# Patient Record
Sex: FEMALE | Race: BLACK OR AFRICAN AMERICAN | NOT HISPANIC OR LATINO | URBAN - METROPOLITAN AREA
[De-identification: names, ages, dates, MRNs, and addresses within clinical notes are randomized per-mention and may not be internally consistent; named-entity substitution may affect disease eponyms.]

---

## 2024-05-27 ENCOUNTER — INPATIENT (INPATIENT)
Facility: HOSPITAL | Age: 74
LOS: 5 days | Discharge: HOME | DRG: 812 | End: 2024-06-02
Attending: INTERNAL MEDICINE | Admitting: STUDENT IN AN ORGANIZED HEALTH CARE EDUCATION/TRAINING PROGRAM
Payer: MEDICARE

## 2024-05-27 VITALS
HEART RATE: 101 BPM | SYSTOLIC BLOOD PRESSURE: 119 MMHG | HEIGHT: 66 IN | TEMPERATURE: 99 F | DIASTOLIC BLOOD PRESSURE: 79 MMHG | RESPIRATION RATE: 18 BRPM | WEIGHT: 167.99 LBS | OXYGEN SATURATION: 98 %

## 2024-05-27 DIAGNOSIS — E78.5 HYPERLIPIDEMIA, UNSPECIFIED: ICD-10-CM

## 2024-05-27 DIAGNOSIS — F20.9 SCHIZOPHRENIA, UNSPECIFIED: ICD-10-CM

## 2024-05-27 DIAGNOSIS — E11.22 TYPE 2 DIABETES MELLITUS WITH DIABETIC CHRONIC KIDNEY DISEASE: ICD-10-CM

## 2024-05-27 DIAGNOSIS — D62 ACUTE POSTHEMORRHAGIC ANEMIA: ICD-10-CM

## 2024-05-27 DIAGNOSIS — N17.9 ACUTE KIDNEY FAILURE, UNSPECIFIED: ICD-10-CM

## 2024-05-27 DIAGNOSIS — I12.9 HYPERTENSIVE CHRONIC KIDNEY DISEASE WITH STAGE 1 THROUGH STAGE 4 CHRONIC KIDNEY DISEASE, OR UNSPECIFIED CHRONIC KIDNEY DISEASE: ICD-10-CM

## 2024-05-27 DIAGNOSIS — D32.9 BENIGN NEOPLASM OF MENINGES, UNSPECIFIED: ICD-10-CM

## 2024-05-27 DIAGNOSIS — N18.4 CHRONIC KIDNEY DISEASE, STAGE 4 (SEVERE): ICD-10-CM
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
## 2024-05-27 DIAGNOSIS — I25.2 OLD MYOCARDIAL INFARCTION: ICD-10-CM

## 2024-05-27 DIAGNOSIS — R18.8 OTHER ASCITES: ICD-10-CM

## 2024-05-27 DIAGNOSIS — S30.1XXA CONTUSION OF ABDOMINAL WALL, INITIAL ENCOUNTER: ICD-10-CM

## 2024-05-27 DIAGNOSIS — E87.5 HYPERKALEMIA: ICD-10-CM

## 2024-05-27 DIAGNOSIS — T80.89XA OTHER COMPLICATIONS FOLLOWING INFUSION, TRANSFUSION AND THERAPEUTIC INJECTION, INITIAL ENCOUNTER: ICD-10-CM

## 2024-05-27 DIAGNOSIS — Z86.718 PERSONAL HISTORY OF OTHER VENOUS THROMBOSIS AND EMBOLISM: ICD-10-CM
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
## 2024-05-27 DIAGNOSIS — K59.00 CONSTIPATION, UNSPECIFIED: ICD-10-CM

## 2024-05-27 LAB
ALBUMIN SERPL ELPH-MCNC: 4.1 G/DL — SIGNIFICANT CHANGE UP (ref 3.5–5.2)
ALP SERPL-CCNC: 68 U/L — SIGNIFICANT CHANGE UP (ref 30–115)
ALT FLD-CCNC: 6 U/L — SIGNIFICANT CHANGE UP (ref 0–41)
ANION GAP SERPL CALC-SCNC: 10 MMOL/L — SIGNIFICANT CHANGE UP (ref 7–14)
APPEARANCE UR: CLEAR — SIGNIFICANT CHANGE UP
APTT BLD: 44 SEC — HIGH (ref 27–39.2)
AST SERPL-CCNC: 14 U/L — SIGNIFICANT CHANGE UP (ref 0–41)
BASE EXCESS BLDV CALC-SCNC: -1.3 MMOL/L — SIGNIFICANT CHANGE UP (ref -2–3)
BASOPHILS # BLD AUTO: 0.01 K/UL — SIGNIFICANT CHANGE UP (ref 0–0.2)
BASOPHILS NFR BLD AUTO: 0.1 % — SIGNIFICANT CHANGE UP (ref 0–1)
BILIRUB SERPL-MCNC: <0.2 MG/DL — SIGNIFICANT CHANGE UP (ref 0.2–1.2)
BILIRUB UR-MCNC: NEGATIVE — SIGNIFICANT CHANGE UP
BUN SERPL-MCNC: 48 MG/DL — HIGH (ref 10–20)
CA-I SERPL-SCNC: 1.18 MMOL/L — SIGNIFICANT CHANGE UP (ref 1.15–1.33)
CALCIUM SERPL-MCNC: 9.3 MG/DL — SIGNIFICANT CHANGE UP (ref 8.4–10.4)
CHLORIDE SERPL-SCNC: 103 MMOL/L — SIGNIFICANT CHANGE UP (ref 98–110)
CO2 SERPL-SCNC: 23 MMOL/L — SIGNIFICANT CHANGE UP (ref 17–32)
COLOR SPEC: YELLOW — SIGNIFICANT CHANGE UP
CREAT SERPL-MCNC: 1.7 MG/DL — HIGH (ref 0.7–1.5)
DIFF PNL FLD: NEGATIVE — SIGNIFICANT CHANGE UP
EGFR: 31 ML/MIN/1.73M2 — LOW
EOSINOPHIL # BLD AUTO: 0 K/UL — SIGNIFICANT CHANGE UP (ref 0–0.7)
EOSINOPHIL NFR BLD AUTO: 0 % — SIGNIFICANT CHANGE UP (ref 0–8)
GAS PNL BLDV: 134 MMOL/L — LOW (ref 136–145)
GAS PNL BLDV: SIGNIFICANT CHANGE UP
GLUCOSE SERPL-MCNC: 117 MG/DL — HIGH (ref 70–99)
GLUCOSE UR QL: NEGATIVE MG/DL — SIGNIFICANT CHANGE UP
HCO3 BLDV-SCNC: 25 MMOL/L — SIGNIFICANT CHANGE UP (ref 22–29)
HCT VFR BLD CALC: 27.6 % — LOW (ref 37–47)
HCT VFR BLDA CALC: 29 % — LOW (ref 34.5–46.5)
HGB BLD CALC-MCNC: 9.8 G/DL — LOW (ref 11.7–16.1)
HGB BLD-MCNC: 8.6 G/DL — LOW (ref 12–16)
IMM GRANULOCYTES NFR BLD AUTO: 0.6 % — HIGH (ref 0.1–0.3)
INR BLD: 1.23 RATIO — SIGNIFICANT CHANGE UP (ref 0.65–1.3)
KETONES UR-MCNC: NEGATIVE MG/DL — SIGNIFICANT CHANGE UP
LACTATE BLDV-MCNC: 1.1 MMOL/L — SIGNIFICANT CHANGE UP (ref 0.5–2)
LEUKOCYTE ESTERASE UR-ACNC: NEGATIVE — SIGNIFICANT CHANGE UP
LIDOCAIN IGE QN: 18 U/L — SIGNIFICANT CHANGE UP (ref 7–60)
LYMPHOCYTES # BLD AUTO: 1.58 K/UL — SIGNIFICANT CHANGE UP (ref 1.2–3.4)
LYMPHOCYTES # BLD AUTO: 19.4 % — LOW (ref 20.5–51.1)
MCHC RBC-ENTMCNC: 27.6 PG — SIGNIFICANT CHANGE UP (ref 27–31)
MCHC RBC-ENTMCNC: 31.2 G/DL — LOW (ref 32–37)
MCV RBC AUTO: 88.5 FL — SIGNIFICANT CHANGE UP (ref 81–99)
MONOCYTES # BLD AUTO: 0.61 K/UL — HIGH (ref 0.1–0.6)
MONOCYTES NFR BLD AUTO: 7.5 % — SIGNIFICANT CHANGE UP (ref 1.7–9.3)
NEUTROPHILS # BLD AUTO: 5.91 K/UL — SIGNIFICANT CHANGE UP (ref 1.4–6.5)
NEUTROPHILS NFR BLD AUTO: 72.4 % — SIGNIFICANT CHANGE UP (ref 42.2–75.2)
NITRITE UR-MCNC: NEGATIVE — SIGNIFICANT CHANGE UP
NRBC # BLD: 0 /100 WBCS — SIGNIFICANT CHANGE UP (ref 0–0)
PCO2 BLDV: 47 MMHG — HIGH (ref 39–42)
PH BLDV: 7.33 — SIGNIFICANT CHANGE UP (ref 7.32–7.43)
PH UR: 6 — SIGNIFICANT CHANGE UP (ref 5–8)
PLATELET # BLD AUTO: 332 K/UL — SIGNIFICANT CHANGE UP (ref 130–400)
PMV BLD: 10.5 FL — HIGH (ref 7.4–10.4)
PO2 BLDV: 14 MMHG — LOW (ref 25–45)
POTASSIUM BLDV-SCNC: 6 MMOL/L — HIGH (ref 3.5–5.1)
POTASSIUM SERPL-MCNC: 6.2 MMOL/L — CRITICAL HIGH (ref 3.5–5)
POTASSIUM SERPL-SCNC: 6.2 MMOL/L — CRITICAL HIGH (ref 3.5–5)
PROT SERPL-MCNC: 7.5 G/DL — SIGNIFICANT CHANGE UP (ref 6–8)
PROT UR-MCNC: NEGATIVE MG/DL — SIGNIFICANT CHANGE UP
PROTHROM AB SERPL-ACNC: 14.1 SEC — HIGH (ref 9.95–12.87)
RBC # BLD: 3.12 M/UL — LOW (ref 4.2–5.4)
RBC # FLD: 13.6 % — SIGNIFICANT CHANGE UP (ref 11.5–14.5)
SAO2 % BLDV: 17.9 % — LOW (ref 67–88)
SODIUM SERPL-SCNC: 136 MMOL/L — SIGNIFICANT CHANGE UP (ref 135–146)
SP GR SPEC: >1.03 — HIGH (ref 1–1.03)
UROBILINOGEN FLD QL: 0.2 MG/DL — SIGNIFICANT CHANGE UP (ref 0.2–1)
WBC # BLD: 8.16 K/UL — SIGNIFICANT CHANGE UP (ref 4.8–10.8)
WBC # FLD AUTO: 8.16 K/UL — SIGNIFICANT CHANGE UP (ref 4.8–10.8)

## 2024-05-27 PROCEDURE — 93970 EXTREMITY STUDY: CPT

## 2024-05-27 PROCEDURE — 84300 ASSAY OF URINE SODIUM: CPT

## 2024-05-27 PROCEDURE — 36415 COLL VENOUS BLD VENIPUNCTURE: CPT

## 2024-05-27 PROCEDURE — 80048 BASIC METABOLIC PNL TOTAL CA: CPT

## 2024-05-27 PROCEDURE — 74174 CTA ABD&PLVS W/CONTRAST: CPT | Mod: MC

## 2024-05-27 PROCEDURE — 83605 ASSAY OF LACTIC ACID: CPT

## 2024-05-27 PROCEDURE — 83880 ASSAY OF NATRIURETIC PEPTIDE: CPT

## 2024-05-27 PROCEDURE — 85025 COMPLETE CBC W/AUTO DIFF WBC: CPT

## 2024-05-27 PROCEDURE — 81003 URINALYSIS AUTO W/O SCOPE: CPT

## 2024-05-27 PROCEDURE — 36430 TRANSFUSION BLD/BLD COMPNT: CPT

## 2024-05-27 PROCEDURE — 86900 BLOOD TYPING SEROLOGIC ABO: CPT

## 2024-05-27 PROCEDURE — 76770 US EXAM ABDO BACK WALL COMP: CPT

## 2024-05-27 PROCEDURE — 86923 COMPATIBILITY TEST ELECTRIC: CPT

## 2024-05-27 PROCEDURE — 93010 ELECTROCARDIOGRAM REPORT: CPT

## 2024-05-27 PROCEDURE — P9016: CPT

## 2024-05-27 PROCEDURE — 80053 COMPREHEN METABOLIC PANEL: CPT

## 2024-05-27 PROCEDURE — 74018 RADEX ABDOMEN 1 VIEW: CPT

## 2024-05-27 PROCEDURE — 71045 X-RAY EXAM CHEST 1 VIEW: CPT

## 2024-05-27 PROCEDURE — 74177 CT ABD & PELVIS W/CONTRAST: CPT | Mod: 26,MC

## 2024-05-27 PROCEDURE — 82570 ASSAY OF URINE CREATININE: CPT

## 2024-05-27 PROCEDURE — 99285 EMERGENCY DEPT VISIT HI MDM: CPT

## 2024-05-27 PROCEDURE — 85027 COMPLETE CBC AUTOMATED: CPT

## 2024-05-27 PROCEDURE — 83735 ASSAY OF MAGNESIUM: CPT

## 2024-05-27 PROCEDURE — P9040: CPT

## 2024-05-27 PROCEDURE — 86901 BLOOD TYPING SEROLOGIC RH(D): CPT

## 2024-05-27 PROCEDURE — 82962 GLUCOSE BLOOD TEST: CPT

## 2024-05-27 PROCEDURE — 99221 1ST HOSP IP/OBS SF/LOW 40: CPT

## 2024-05-27 PROCEDURE — 86850 RBC ANTIBODY SCREEN: CPT

## 2024-05-27 RX ORDER — DEXTROSE 50 % IN WATER 50 %
25 SYRINGE (ML) INTRAVENOUS ONCE
Refills: 0 | Status: COMPLETED | OUTPATIENT
Start: 2024-05-27 | End: 2024-05-27

## 2024-05-27 RX ORDER — PIPERACILLIN AND TAZOBACTAM 4; .5 G/20ML; G/20ML
3.38 INJECTION, POWDER, LYOPHILIZED, FOR SOLUTION INTRAVENOUS ONCE
Refills: 0 | Status: COMPLETED | OUTPATIENT
Start: 2024-05-27 | End: 2024-05-27

## 2024-05-27 RX ORDER — SODIUM CHLORIDE 9 MG/ML
1000 INJECTION INTRAMUSCULAR; INTRAVENOUS; SUBCUTANEOUS ONCE
Refills: 0 | Status: COMPLETED | OUTPATIENT
Start: 2024-05-27 | End: 2024-05-27

## 2024-05-27 RX ORDER — SODIUM ZIRCONIUM CYCLOSILICATE 10 G/10G
10 POWDER, FOR SUSPENSION ORAL ONCE
Refills: 0 | Status: COMPLETED | OUTPATIENT
Start: 2024-05-27 | End: 2024-05-27

## 2024-05-27 RX ORDER — VANCOMYCIN HCL 1 G
1250 VIAL (EA) INTRAVENOUS ONCE
Refills: 0 | Status: COMPLETED | OUTPATIENT
Start: 2024-05-27 | End: 2024-05-27

## 2024-05-27 RX ORDER — INSULIN HUMAN 100 [IU]/ML
5 INJECTION, SOLUTION SUBCUTANEOUS ONCE
Refills: 0 | Status: COMPLETED | OUTPATIENT
Start: 2024-05-27 | End: 2024-05-27

## 2024-05-27 RX ADMIN — Medication 25 MILLILITER(S): at 19:43

## 2024-05-27 RX ADMIN — Medication 166.67 MILLIGRAM(S): at 18:56

## 2024-05-27 RX ADMIN — PIPERACILLIN AND TAZOBACTAM 200 GRAM(S): 4; .5 INJECTION, POWDER, LYOPHILIZED, FOR SOLUTION INTRAVENOUS at 18:56

## 2024-05-27 RX ADMIN — INSULIN HUMAN 5 UNIT(S): 100 INJECTION, SOLUTION SUBCUTANEOUS at 19:39

## 2024-05-27 RX ADMIN — SODIUM CHLORIDE 1000 MILLILITER(S): 9 INJECTION INTRAMUSCULAR; INTRAVENOUS; SUBCUTANEOUS at 18:42

## 2024-05-27 RX ADMIN — SODIUM ZIRCONIUM CYCLOSILICATE 10 GRAM(S): 10 POWDER, FOR SUSPENSION ORAL at 19:39

## 2024-05-27 NOTE — ED PROVIDER NOTE - CARE PLAN
Principal Discharge DX:	Abdominal wall fluid collections  Secondary Diagnosis:	Deep hematoma  Secondary Diagnosis:	Normocytic anemia  Secondary Diagnosis:	History of DVT of lower extremity  Secondary Diagnosis:	Hyperkalemia   1

## 2024-05-27 NOTE — ED ADULT NURSE NOTE - NSFALLHARMRISKINTERV_ED_ALL_ED

## 2024-05-27 NOTE — ED PROVIDER NOTE - ATTENDING CONTRIBUTION TO CARE
74-year-old female to ED with abdominal pain over the past several days.  Patient is a senior living facility which she gets intra-abdominal subcu infection injections.  With the past few days noted swelling redness and tenderness especially over the right lower quadrant.  On exam large firm red tender mass

## 2024-05-27 NOTE — ED PROVIDER NOTE - OBJECTIVE STATEMENT
Patient is a 74-year-old woman with a history of hypertension, diabetes, CKD, schizophrenia, renal cell carcinoma, surgical history of oophorectomy, resident of [facility], brought in by EMS for abdominal pain.  Patient states that the right lower quadrant of her abdomen has been burning since yesterday.  On exam, right lower quadrant is noted to be indurated and with increased warmth to the touch.  No fever.  No nausea, vomiting, diarrhea.  Urinary symptoms, including dysuria, frequency, urgency.  No chest pain, shortness of breath, cough. Patient is a 74-year-old woman with a history of hypertension, diabetes, CKD, schizophrenia, renal cell carcinoma, DVT on Lovenox, surgical history of oophorectomy, resident of [facility], brought in by EMS for abdominal pain.  Patient states that the right lower quadrant of her abdomen has been burning since yesterday.  On exam, right lower quadrant is noted to be indurated and with increased warmth to the touch.  No fever.  No nausea, vomiting, diarrhea.  Urinary symptoms, including dysuria, frequency, urgency.  No chest pain, shortness of breath, cough.

## 2024-05-27 NOTE — CONSULT NOTE ADULT - ATTENDING COMMENTS
ACS Attending Note Attestation    Patient is examined and evaluated at the bedside with the residents/PAs. Treatment plan discussed with the team, nurses, and consulting physicians and consulting teams. Medications, radiological studies and all other relevant studies reviewed. I reviewed the resident/PA note and agreed with above assessment and plan with following additions and corrections.    VANGIE SEBASTIAN Patient is a 74y old  Female who presents with a chief complaint of abdominal wall hematoma referred for surgery evaluation    Physical Exam: right side lower abdominal wall hematoma, not tender on exam  Radiological studies reviewed by me with following noted: CT abdomen/pelvis demonstrated large right side abdominal wall hematoma without extravasation of the contrast   Allergies  haloperidol (Unknown)  Intolerances    PAST MEDICAL & SURGICAL HISTORY:  Diabetes mellitus  Hypertension  Type 2 myocardial infarction due to hypertension  Stage 4 chronic kidney disease  Schizophrenia  MDD (major depressive disorder)  Constipation  History of bilateral oophorectomies      Diagnosis:  Spontaneous abdominal wall hematoma on AC    Plan:	  - follow up Hg/Hct  - No intervention from the surgery stand point  - IR when solid hematoma become liquified   - supportive care  - GI/DVT prophylaxis  - pain management  - follow up consults  - repeat studies as needed    Farida Szymanski MD, FACS  Trauma/ACS/Surcical Critical care Attending

## 2024-05-27 NOTE — ED ADULT TRIAGE NOTE - PATIENT ON (OXYGEN DELIVERY METHOD)
What Type Of Note Output Would You Prefer (Optional)?: Bullet Format How Severe Is Your Skin Lesion?: moderate Has Your Skin Lesion Been Treated?: not been treated Is This A New Presentation, Or A Follow-Up?: Skin Lesion room air

## 2024-05-27 NOTE — ED PROVIDER NOTE - PHYSICAL EXAMINATION
Vital signs reviewed  General: Well nourished, comfortable  Skin: Warm, dry; + large area of induration to the RLQ of the abdomen, with increased warmth to the touch; no blisters, fluctuance, or crepitus  Head & neck: NCAT, supple neck  Eyes: EOMI, PER B/L  ENT: MMM  Card: RRR, S1, S2; no murmurs, no rubs, no gallops  Resp: Normal respiratory effort, CTAB, no rales, no wheezing  Abd: + Large area of induration to the RLQ of the abdomen, with increased warmth to the touch; no blisters, fluctuance, or crepitus, otherwise soft, ND, NT, no rebound, no guarding; no CVAT  Ext: Pulses palpable distally  NeuroMSK: Grossly intact  Psych: Awake, alert, cooperative, appropriate

## 2024-05-27 NOTE — ED ADULT TRIAGE NOTE - NS ED NURSE BANDS TYPE
Detail Level: Generalized Quality 226: Preventive Care And Screening: Tobacco Use: Screening And Cessation Intervention: Patient screened for tobacco use and is an ex/non-smoker Name band;

## 2024-05-27 NOTE — CONSULT NOTE ADULT - ASSESSMENT
74-year-old woman with a history of hypertension, diabetes, CKD, schizophrenia, renal cell carcinoma, DVT on Lovenox, surgical history of oophorectomy, resident of [facility], brought in by EMS for abdominal pain.  Afebrile with normal WBC. CT scan shows anterior abdominal wall collection reflecting hematoma.     Plan:   No acute surgical intervention   Needs abdominal binder  Daily CBC  Avoid injecting lovenox to the abdomen. Would benefit from NOAC if able   IR consult for possible drainage     Above to be discussed with attending, Dr Szymanski.  74-year-old woman with a history of hypertension, diabetes, CKD, schizophrenia, renal cell carcinoma, DVT on Lovenox, surgical history of oophorectomy, resident of [facility], brought in by EMS for abdominal pain.  Afebrile with normal WBC. CT scan shows anterior abdominal wall collection reflecting hematoma.     Plan:   No acute surgical intervention   Needs abdominal binder  Daily CBC  HOLD AC for now   IR consult for possible drainage and/or IVC filter in setting of DVT    Above to be discussed with attending, Dr Szymanski.

## 2024-05-27 NOTE — ED ADULT NURSE NOTE - CHIEF COMPLAINT QUOTE
PT presents to the ED from [facility] c/o of b/l lower quadrant abd pain. PT denies nausea or vomiting

## 2024-05-27 NOTE — ED ADULT TRIAGE NOTE - CHIEF COMPLAINT QUOTE
PT presents to the ED from [facility] c/o of b/l lower quadrant abd pain. PT denies nausea or vomiting

## 2024-05-27 NOTE — ED PROVIDER NOTE - PROGRESS NOTE DETAILS
Resident JERICA Garza: Bedside ultrasound with cobblestoning and fluid collection, suspicious for loculated abscesses and surrounding cellulitis.  Patient going to CT now for further characterization. Resident JERICA Garza: General Surgery consulted. General surgery evaluated the patient, and believes that the fluid collection in the anterior abdominal wall is consistent with a hematoma as opposed to an abscess.  Patient is afebrile, without leukocytosis, and normal lactate.  Nonetheless, blood cultures already sent, and vancomycin ordered.  Hemoglobin noted to be at 8.6, without a prior level.  Patient will need serial CBCs, and holding of anticoagulation.  Hyperkalemia noted, however no EKG changes.  Insulin dextrose and Lokelma ordered.

## 2024-05-27 NOTE — ED PROVIDER NOTE - CLINICAL SUMMARY MEDICAL DECISION MAKING FREE TEXT BOX
Abdominal hematoma versus abscess noted plan for broad-spectrum IV antibiotics consult surgery will need admission and may be consult IR

## 2024-05-28 DIAGNOSIS — Z90.722 ACQUIRED ABSENCE OF OVARIES, BILATERAL: Chronic | ICD-10-CM

## 2024-05-28 LAB
ABO RH CONFIRMATION: SIGNIFICANT CHANGE UP
ALBUMIN SERPL ELPH-MCNC: 3.5 G/DL — SIGNIFICANT CHANGE UP (ref 3.5–5.2)
ALP SERPL-CCNC: 49 U/L — SIGNIFICANT CHANGE UP (ref 30–115)
ALT FLD-CCNC: <5 U/L — SIGNIFICANT CHANGE UP (ref 0–41)
ANION GAP SERPL CALC-SCNC: 10 MMOL/L — SIGNIFICANT CHANGE UP (ref 7–14)
ANION GAP SERPL CALC-SCNC: 11 MMOL/L — SIGNIFICANT CHANGE UP (ref 7–14)
ANION GAP SERPL CALC-SCNC: 9 MMOL/L — SIGNIFICANT CHANGE UP (ref 7–14)
AST SERPL-CCNC: 10 U/L — SIGNIFICANT CHANGE UP (ref 0–41)
BILIRUB SERPL-MCNC: 0.2 MG/DL — SIGNIFICANT CHANGE UP (ref 0.2–1.2)
BLD GP AB SCN SERPL QL: SIGNIFICANT CHANGE UP
BUN SERPL-MCNC: 44 MG/DL — HIGH (ref 10–20)
CALCIUM SERPL-MCNC: 7.9 MG/DL — LOW (ref 8.4–10.4)
CALCIUM SERPL-MCNC: 8.3 MG/DL — LOW (ref 8.4–10.5)
CALCIUM SERPL-MCNC: 8.6 MG/DL — SIGNIFICANT CHANGE UP (ref 8.4–10.5)
CHLORIDE SERPL-SCNC: 103 MMOL/L — SIGNIFICANT CHANGE UP (ref 98–110)
CHLORIDE SERPL-SCNC: 104 MMOL/L — SIGNIFICANT CHANGE UP (ref 98–110)
CHLORIDE SERPL-SCNC: 104 MMOL/L — SIGNIFICANT CHANGE UP (ref 98–110)
CO2 SERPL-SCNC: 20 MMOL/L — SIGNIFICANT CHANGE UP (ref 17–32)
CO2 SERPL-SCNC: 20 MMOL/L — SIGNIFICANT CHANGE UP (ref 17–32)
CO2 SERPL-SCNC: 23 MMOL/L — SIGNIFICANT CHANGE UP (ref 17–32)
CREAT SERPL-MCNC: 1.5 MG/DL — SIGNIFICANT CHANGE UP (ref 0.7–1.5)
CREAT SERPL-MCNC: 1.5 MG/DL — SIGNIFICANT CHANGE UP (ref 0.7–1.5)
CREAT SERPL-MCNC: 1.6 MG/DL — HIGH (ref 0.7–1.5)
EGFR: 34 ML/MIN/1.73M2 — LOW
EGFR: 37 ML/MIN/1.73M2 — LOW
EGFR: 37 ML/MIN/1.73M2 — LOW
GLUCOSE BLDC GLUCOMTR-MCNC: 116 MG/DL — HIGH (ref 70–99)
GLUCOSE BLDC GLUCOMTR-MCNC: 124 MG/DL — HIGH (ref 70–99)
GLUCOSE BLDC GLUCOMTR-MCNC: 125 MG/DL — HIGH (ref 70–99)
GLUCOSE BLDC GLUCOMTR-MCNC: 126 MG/DL — HIGH (ref 70–99)
GLUCOSE BLDC GLUCOMTR-MCNC: 172 MG/DL — HIGH (ref 70–99)
GLUCOSE BLDC GLUCOMTR-MCNC: 211 MG/DL — HIGH (ref 70–99)
GLUCOSE SERPL-MCNC: 108 MG/DL — HIGH (ref 70–99)
GLUCOSE SERPL-MCNC: 126 MG/DL — HIGH (ref 70–99)
GLUCOSE SERPL-MCNC: 295 MG/DL — HIGH (ref 70–99)
HCT VFR BLD CALC: 16.2 % — LOW (ref 37–47)
HCT VFR BLD CALC: 19.6 % — LOW (ref 37–47)
HCT VFR BLD CALC: 20.8 % — LOW (ref 37–47)
HCT VFR BLD CALC: 23.5 % — LOW (ref 37–47)
HGB BLD-MCNC: 5.1 G/DL — CRITICAL LOW (ref 12–16)
HGB BLD-MCNC: 6.4 G/DL — CRITICAL LOW (ref 12–16)
HGB BLD-MCNC: 6.7 G/DL — CRITICAL LOW (ref 12–16)
HGB BLD-MCNC: 7.3 G/DL — LOW (ref 12–16)
MAGNESIUM SERPL-MCNC: 1.8 MG/DL — SIGNIFICANT CHANGE UP (ref 1.8–2.4)
MCHC RBC-ENTMCNC: 27.3 PG — SIGNIFICANT CHANGE UP (ref 27–31)
MCHC RBC-ENTMCNC: 27.4 PG — SIGNIFICANT CHANGE UP (ref 27–31)
MCHC RBC-ENTMCNC: 27.8 PG — SIGNIFICANT CHANGE UP (ref 27–31)
MCHC RBC-ENTMCNC: 28.3 PG — SIGNIFICANT CHANGE UP (ref 27–31)
MCHC RBC-ENTMCNC: 31.1 G/DL — LOW (ref 32–37)
MCHC RBC-ENTMCNC: 31.5 G/DL — LOW (ref 32–37)
MCHC RBC-ENTMCNC: 32.2 G/DL — SIGNIFICANT CHANGE UP (ref 32–37)
MCHC RBC-ENTMCNC: 32.7 G/DL — SIGNIFICANT CHANGE UP (ref 32–37)
MCV RBC AUTO: 86.3 FL — SIGNIFICANT CHANGE UP (ref 81–99)
MCV RBC AUTO: 86.6 FL — SIGNIFICANT CHANGE UP (ref 81–99)
MCV RBC AUTO: 86.7 FL — SIGNIFICANT CHANGE UP (ref 81–99)
MCV RBC AUTO: 88.3 FL — SIGNIFICANT CHANGE UP (ref 81–99)
NRBC # BLD: 0 /100 WBCS — SIGNIFICANT CHANGE UP (ref 0–0)
PLATELET # BLD AUTO: 226 K/UL — SIGNIFICANT CHANGE UP (ref 130–400)
PLATELET # BLD AUTO: 257 K/UL — SIGNIFICANT CHANGE UP (ref 130–400)
PLATELET # BLD AUTO: 260 K/UL — SIGNIFICANT CHANGE UP (ref 130–400)
PLATELET # BLD AUTO: 269 K/UL — SIGNIFICANT CHANGE UP (ref 130–400)
PMV BLD: 10.5 FL — HIGH (ref 7.4–10.4)
PMV BLD: 10.6 FL — HIGH (ref 7.4–10.4)
PMV BLD: 11.6 FL — HIGH (ref 7.4–10.4)
PMV BLD: SIGNIFICANT CHANGE UP (ref 7.4–10.4)
POTASSIUM SERPL-MCNC: 5 MMOL/L — SIGNIFICANT CHANGE UP (ref 3.5–5)
POTASSIUM SERPL-MCNC: 5.6 MMOL/L — HIGH (ref 3.5–5)
POTASSIUM SERPL-MCNC: 5.7 MMOL/L — HIGH (ref 3.5–5)
POTASSIUM SERPL-SCNC: 5 MMOL/L — SIGNIFICANT CHANGE UP (ref 3.5–5)
POTASSIUM SERPL-SCNC: 5.6 MMOL/L — HIGH (ref 3.5–5)
POTASSIUM SERPL-SCNC: 5.7 MMOL/L — HIGH (ref 3.5–5)
PROT SERPL-MCNC: 6.1 G/DL — SIGNIFICANT CHANGE UP (ref 6–8)
RBC # BLD: 1.87 M/UL — LOW (ref 4.2–5.4)
RBC # BLD: 2.26 M/UL — LOW (ref 4.2–5.4)
RBC # BLD: 2.41 M/UL — LOW (ref 4.2–5.4)
RBC # BLD: 2.66 M/UL — LOW (ref 4.2–5.4)
RBC # FLD: 13.4 % — SIGNIFICANT CHANGE UP (ref 11.5–14.5)
RBC # FLD: 13.6 % — SIGNIFICANT CHANGE UP (ref 11.5–14.5)
RBC # FLD: 13.6 % — SIGNIFICANT CHANGE UP (ref 11.5–14.5)
RBC # FLD: 13.7 % — SIGNIFICANT CHANGE UP (ref 11.5–14.5)
SODIUM SERPL-SCNC: 133 MMOL/L — LOW (ref 135–146)
SODIUM SERPL-SCNC: 135 MMOL/L — SIGNIFICANT CHANGE UP (ref 135–146)
SODIUM SERPL-SCNC: 136 MMOL/L — SIGNIFICANT CHANGE UP (ref 135–146)
WBC # BLD: 6.52 K/UL — SIGNIFICANT CHANGE UP (ref 4.8–10.8)
WBC # BLD: 7.74 K/UL — SIGNIFICANT CHANGE UP (ref 4.8–10.8)
WBC # BLD: 8.63 K/UL — SIGNIFICANT CHANGE UP (ref 4.8–10.8)
WBC # BLD: 8.73 K/UL — SIGNIFICANT CHANGE UP (ref 4.8–10.8)
WBC # FLD AUTO: 6.52 K/UL — SIGNIFICANT CHANGE UP (ref 4.8–10.8)
WBC # FLD AUTO: 7.74 K/UL — SIGNIFICANT CHANGE UP (ref 4.8–10.8)
WBC # FLD AUTO: 8.63 K/UL — SIGNIFICANT CHANGE UP (ref 4.8–10.8)
WBC # FLD AUTO: 8.73 K/UL — SIGNIFICANT CHANGE UP (ref 4.8–10.8)

## 2024-05-28 PROCEDURE — 99222 1ST HOSP IP/OBS MODERATE 55: CPT

## 2024-05-28 PROCEDURE — 93970 EXTREMITY STUDY: CPT | Mod: 26

## 2024-05-28 PROCEDURE — 99231 SBSQ HOSP IP/OBS SF/LOW 25: CPT

## 2024-05-28 RX ORDER — HEXAVITAMINS
0.5 TABLET ORAL
Refills: 0 | DISCHARGE

## 2024-05-28 RX ORDER — ACETAMINOPHEN 500 MG
1000 TABLET ORAL EVERY 8 HOURS
Refills: 0 | Status: DISCONTINUED | OUTPATIENT
Start: 2024-05-28 | End: 2024-06-02

## 2024-05-28 RX ORDER — ASCORBIC ACID 60 MG
1 TABLET,CHEWABLE ORAL
Refills: 0 | DISCHARGE

## 2024-05-28 RX ORDER — DEXTROSE 50 % IN WATER 50 %
50 SYRINGE (ML) INTRAVENOUS ONCE
Refills: 0 | Status: COMPLETED | OUTPATIENT
Start: 2024-05-28 | End: 2024-05-28

## 2024-05-28 RX ORDER — FERROUS SULFATE 325(65) MG
1 TABLET ORAL
Refills: 0 | DISCHARGE

## 2024-05-28 RX ORDER — HEXAVITAMINS
50 TABLET ORAL
Refills: 0 | Status: DISCONTINUED | OUTPATIENT
Start: 2024-05-28 | End: 2024-05-28

## 2024-05-28 RX ORDER — MIRTAZAPINE 45 MG/1
1 TABLET, ORALLY DISINTEGRATING ORAL
Refills: 0 | DISCHARGE

## 2024-05-28 RX ORDER — OXYCODONE HYDROCHLORIDE 5 MG/1
2.5 TABLET ORAL EVERY 4 HOURS
Refills: 0 | Status: DISCONTINUED | OUTPATIENT
Start: 2024-05-28 | End: 2024-06-02

## 2024-05-28 RX ORDER — ISOSORBIDE DINITRATE 5 MG/1
10 TABLET ORAL THREE TIMES A DAY
Refills: 0 | Status: DISCONTINUED | OUTPATIENT
Start: 2024-05-28 | End: 2024-06-02

## 2024-05-28 RX ORDER — ISOSORBIDE DINITRATE 5 MG/1
1 TABLET ORAL
Refills: 0 | DISCHARGE

## 2024-05-28 RX ORDER — SODIUM CHLORIDE 9 MG/ML
500 INJECTION, SOLUTION INTRAVENOUS ONCE
Refills: 0 | Status: COMPLETED | OUTPATIENT
Start: 2024-05-28 | End: 2024-05-28

## 2024-05-28 RX ORDER — ATORVASTATIN CALCIUM 80 MG/1
80 TABLET, FILM COATED ORAL AT BEDTIME
Refills: 0 | Status: DISCONTINUED | OUTPATIENT
Start: 2024-05-28 | End: 2024-06-02

## 2024-05-28 RX ORDER — MIRTAZAPINE 45 MG/1
15 TABLET, ORALLY DISINTEGRATING ORAL AT BEDTIME
Refills: 0 | Status: DISCONTINUED | OUTPATIENT
Start: 2024-05-28 | End: 2024-06-02

## 2024-05-28 RX ORDER — INSULIN HUMAN 100 [IU]/ML
10 INJECTION, SOLUTION SUBCUTANEOUS ONCE
Refills: 0 | Status: COMPLETED | OUTPATIENT
Start: 2024-05-28 | End: 2024-05-28

## 2024-05-28 RX ORDER — SENNA PLUS 8.6 MG/1
2 TABLET ORAL AT BEDTIME
Refills: 0 | Status: DISCONTINUED | OUTPATIENT
Start: 2024-05-28 | End: 2024-06-02

## 2024-05-28 RX ORDER — ASCORBIC ACID 60 MG
500 TABLET,CHEWABLE ORAL DAILY
Refills: 0 | Status: DISCONTINUED | OUTPATIENT
Start: 2024-05-28 | End: 2024-06-02

## 2024-05-28 RX ORDER — SODIUM ZIRCONIUM CYCLOSILICATE 10 G/10G
10 POWDER, FOR SUSPENSION ORAL ONCE
Refills: 0 | Status: COMPLETED | OUTPATIENT
Start: 2024-05-28 | End: 2024-05-29

## 2024-05-28 RX ORDER — HYDRALAZINE HCL 50 MG
25 TABLET ORAL DAILY
Refills: 0 | Status: DISCONTINUED | OUTPATIENT
Start: 2024-05-28 | End: 2024-05-29

## 2024-05-28 RX ORDER — CHOLECALCIFEROL (VITAMIN D3) 125 MCG
1 CAPSULE ORAL
Refills: 0 | DISCHARGE

## 2024-05-28 RX ORDER — FERROUS SULFATE 325(65) MG
325 TABLET ORAL DAILY
Refills: 0 | Status: DISCONTINUED | OUTPATIENT
Start: 2024-05-28 | End: 2024-05-29

## 2024-05-28 RX ORDER — CHOLECALCIFEROL (VITAMIN D3) 125 MCG
2000 CAPSULE ORAL DAILY
Refills: 0 | Status: DISCONTINUED | OUTPATIENT
Start: 2024-05-28 | End: 2024-06-02

## 2024-05-28 RX ADMIN — OXYCODONE HYDROCHLORIDE 2.5 MILLIGRAM(S): 5 TABLET ORAL at 03:24

## 2024-05-28 RX ADMIN — MIRTAZAPINE 15 MILLIGRAM(S): 45 TABLET, ORALLY DISINTEGRATING ORAL at 21:48

## 2024-05-28 RX ADMIN — SODIUM CHLORIDE 1000 MILLILITER(S): 9 INJECTION, SOLUTION INTRAVENOUS at 08:41

## 2024-05-28 RX ADMIN — Medication 1000 MILLIGRAM(S): at 21:48

## 2024-05-28 RX ADMIN — Medication 1000 MILLIGRAM(S): at 06:03

## 2024-05-28 RX ADMIN — ISOSORBIDE DINITRATE 10 MILLIGRAM(S): 5 TABLET ORAL at 06:02

## 2024-05-28 RX ADMIN — Medication 1 TABLET(S): at 12:28

## 2024-05-28 RX ADMIN — Medication 50 MILLILITER(S): at 16:13

## 2024-05-28 RX ADMIN — INSULIN HUMAN 10 UNIT(S): 100 INJECTION, SOLUTION SUBCUTANEOUS at 16:12

## 2024-05-28 RX ADMIN — Medication 25 MILLIGRAM(S): at 06:01

## 2024-05-28 RX ADMIN — Medication 1000 MILLIGRAM(S): at 14:12

## 2024-05-28 RX ADMIN — Medication 325 MILLIGRAM(S): at 12:28

## 2024-05-28 RX ADMIN — Medication 2000 UNIT(S): at 12:28

## 2024-05-28 RX ADMIN — ATORVASTATIN CALCIUM 80 MILLIGRAM(S): 80 TABLET, FILM COATED ORAL at 21:48

## 2024-05-28 RX ADMIN — Medication 500 MILLIGRAM(S): at 12:28

## 2024-05-28 NOTE — H&P ADULT - HISTORY OF PRESENT ILLNESS
a 74-year-old woman with a history of hypertension, diabetes, CKD, schizophrenia, DVT on Lovenox, surgical history of oophorectomy, resident of [facility], brought in by EMS for abdominal pain.  She is complaining from right lower abdominal pain, described as burning that started yesterday, associated with progressively enlarging mass at the same site. She denied any fever, chills, diarrhea, constipation, nausea or vomiting.     Vital Signs T(F): 98.8 HR: 89 BP: 138/80 RR: 19 SpO2: 98%     CT Abdomen and Pelvis w/ IV Cont IMPRESSION: Patent anterior abdominal wall 16.3 x 9.0 x 9.0 cm collection with layering hyperdensities and surrounding soft tissue stranding. Findings may reflect hematoma in appropriate clinical setting. Additional anterior abdominal wall subcutaneous soft tissue nodular densities, possibly reflecting injection granulomas/hematomas. Left lower lobe 0.5 cm pulmonary nodule.    Surgery consulted, their impression is hematoma, admitted for further evaluation and management

## 2024-05-28 NOTE — PROGRESS NOTE ADULT - ATTENDING COMMENTS
Patient seen and examined morning of 5/28 AM. Reported abdomen feels better. No overlying skin changes though abdomen does appear tense on exam. Continue to monitor for now. Trend hemoglobin. Follow up IR evaluation.

## 2024-05-28 NOTE — CONSULT NOTE ADULT - ASSESSMENT
75 yo F with PMH of hypertension, diabetes, CKD, schizophrenia, renal cell carcinoma, DVT on Lovenox, surgical history of oophorectomy, resident of [facility], brought in by EMS for abdominal pain.  Afebrile with normal WBC. CT scan shows anterior abdominal wall collection reflecting hematoma. IR consulted for possible embolization/drainage. Surgery also recommending IVC filter placement.    - Imaging reviewed. No acute IR intervention because no active bleed demonstrated.  - Hold lovenox. Monitor H&H and transfuse PRN.  - Abdominal binder.   - Recommend bilateral lower extremity duplex o determine whether pt a candidate for IVC filter.

## 2024-05-28 NOTE — PROGRESS NOTE ADULT - SUBJECTIVE AND OBJECTIVE BOX
GENERAL SURGERY PROGRESS NOTE    Patient: VANGIE SEBASTIAN , 74y ([birth year])Female   MRN: [id number]  Location: [facility] ED Hold 041 A  Visit: 05-27-24 Inpatient  Date: 05-28-24 @ 10:04      Procedure/Dx/Injuries: abdominal wall hematoma    Events of past 24 hours: no acute events, Hgb 7.3 from 8.6, IR no acute intervention    PAST MEDICAL & SURGICAL HISTORY:  Diabetes mellitus      Hypertension      Type 2 myocardial infarction due to hypertension      Stage 4 chronic kidney disease      Schizophrenia      MDD (major depressive disorder)      Constipation      History of bilateral oophorectomies          Vitals:   T(F): 97.5 (05-28-24 @ 07:38), Max: 99 (05-27-24 @ 17:35)  HR: 91 (05-28-24 @ 07:38)  BP: 90/56 (05-28-24 @ 07:38)  RR: 18 (05-28-24 @ 07:38)  SpO2: 98% (05-28-24 @ 07:38)      Diet, NPO:   Except Medications      Fluids:     I & O's:      PHYSICAL EXAM:  PHYSICAL EXAM:  GENERAL: No acute distress, well-developed  CHEST/LUNG: CTAB; No wheezes, rales, or rhonchi  HEART: Regular rate and rhythm. Normal S1/S2. No murmurs, rubs, or gallops  ABDOMEN: Soft, non-tender, non-distended; right lower abdomen with large area of ecchymosis and induration. Firm to touch. No skin breakdown or wounds overlying area. no fluctuance or crepitus   EXTREMITIES:  2+ peripheral pulses b/l, No clubbing, cyanosis, or edema      MEDICATIONS  (STANDING):  acetaminophen     Tablet .. 1000 milliGRAM(s) Oral every 8 hours  ascorbic acid 500 milliGRAM(s) Oral daily  atorvastatin 80 milliGRAM(s) Oral at bedtime  cholecalciferol 2000 Unit(s) Oral daily  ferrous    sulfate 325 milliGRAM(s) Oral daily  hydrALAZINE 25 milliGRAM(s) Oral daily  hydrochlorothiazide 25 milliGRAM(s) Oral daily  isosorbide   dinitrate Tablet (ISORDIL) 10 milliGRAM(s) Oral three times a day  mirtazapine 15 milliGRAM(s) Oral at bedtime  multivitamin 1 Tablet(s) Oral daily  senna 2 Tablet(s) Oral at bedtime  sodium zirconium cyclosilicate 10 Gram(s) Oral once    MEDICATIONS  (PRN):  oxyCODONE    IR 2.5 milliGRAM(s) Oral every 4 hours PRN Moderate Pain (4 - 6)      DVT PROPHYLAXIS:   GI PROPHYLAXIS:   ANTICOAGULATION:   ANTIBIOTICS:            LAB/STUDIES:  Labs:  CAPILLARY BLOOD GLUCOSE      POCT Blood Glucose.: 172 mg/dL (28 May 2024 08:27)  POCT Blood Glucose.: 118 mg/dL (27 May 2024 19:36)                          7.3    7.74  )-----------( 260      ( 28 May 2024 00:45 )             23.5       Auto Neutrophil %: 72.4 % (05-27-24 @ 18:20)  Auto Immature Granulocyte %: 0.6 % (05-27-24 @ 18:20)    05-28    135  |  104  |  44<H>  ----------------------------<  126<H>  5.0   |  20  |  1.5      Calcium: 8.3 mg/dL (05-28-24 @ 00:45)      LFTs:             7.5  | <0.2 | 14       ------------------[68      ( 27 May 2024 18:20 )  4.1  | x    | 6           Lipase:18     Amylase:x         Blood Gas Venous - Lactate: 1.1 mmol/L (05-27-24 @ 17:55)      Coags:     14.10  ----< 1.23    ( 27 May 2024 19:15 )     44.0                Urinalysis Basic - ( 28 May 2024 00:45 )    Color: x / Appearance: x / SG: x / pH: x  Gluc: 126 mg/dL / Ketone: x  / Bili: x / Urobili: x   Blood: x / Protein: x / Nitrite: x   Leuk Esterase: x / RBC: x / WBC x   Sq Epi: x / Non Sq Epi: x / Bacteria: x        Urinalysis with Rflx Culture (collected 27 May 2024 20:02)              IMAGING:      ACCESS/ DEVICES:  [ xx] Peripheral IV  [ ] Central Venous Line	[ ] R	[ ] L	[ ] IJ	[ ] Fem	[ ] SC	Placed:   [ ] Arterial Line		[ ] R	[ ] L	[ ] Fem	[ ] Rad	[ ] Ax	Placed:   [ ] PICC:					[ ] Mediport  [ ] Urinary Catheter,  Date Placed:   [ ] Chest tube: [ ] Right, [ ] Left  [ ] MARIANELA/Luis Felipe Drains

## 2024-05-28 NOTE — H&P ADULT - NSHPPHYSICALEXAM_GEN_ALL_CORE
PHYSICAL EXAM:  GENERAL: NAD  EYES: conjunctiva and sclera clear  ENMT: No tonsillar erythema, exudates, or enlargement; Moist mucous membranes  NECK: Supple, No JVD, Normal thyroid  HEART: Regular rate and rhythm; No murmurs, rubs, or gallops, Normal S1 S2   RESPIRATORY: Clear to auscultation bilaterally, resonant percussion note, no added sounds   ABDOMEN: right lower quadrant fullness with palpable large firm mass, slightly tender to palpation, immobile, left lower smaller palpable masses. Otherwise soft abdomen  NEUROLOGY: A&Ox3, grossly intact power and sensation   EXTREMITIES:  2+ Peripheral Pulses, No clubbing, cyanosis, or edema  SKIN: warm, dry, normal color, no rash or abnormal lesions

## 2024-05-28 NOTE — H&P ADULT - ATTENDING COMMENTS
a 74-year-old woman with a history of hypertension, diabetes, CKD, schizophrenia, DVT on Lovenox, surgical history of oophorectomy, resident of [facility] [place], brought in by EMS for abdominal pain.      # Abdominal wall mass?hematoma,? less likely infected   - Complaining from abdominal pain   - CT Abdomen and Pelvis w/ IV Cont IMPRESSION: Patent anterior abdominal wall 16.3 x 9.0 x 9.0 cm collection with layering hyperdensities and surrounding soft tissue stranding. Findings may reflect hematoma in appropriate clinical setting. Additional anterior abdominal wall subcutaneous soft tissue nodular densities, possibly reflecting injection granulomas/hematomas. Left lower lobe 0.5 cm pulmonary nodule.  - Surgery on board, impression is hematoma: IR consult for possible drainage and/or IVC filter in setting of DVT  - Abdominal binder  - Hold AC and aspirin (AC for DVT, aspirin for atherosclerotic heart disease per the chart but no mention for stents)   - Pain control with Tylenol and PRN oxycodone   - Trend CBC  - IR consult for possible embolization VS drainage   - Follow up blood cultures, monitor off antibiotics    follow surgery and IR  monitor hgb, transfuse as necesary  in case of persistent drop in hgb transfer to step down       # CKD stage 4  # Hyperkalemia   # HTN   # HLD  - Cr 1.7   - S/P two doses of Lokelma   - Continue home hydralazine, HCTZ, pravastatin, isosorbide dinitrate   - Monitor KFT, K level       # Constipation  - start senna       follow up: monitor potassium, monitor hgb, follow IR and surgery, transfusion as necessary. if persistent drop transfer to stepdown/ICU and contact surgery/IR stat. follow duplex. may need IVC filter, asa AC on hold   monitor BP, HR     patient is high risk of clinical deterioration. (however currently BP stable, not tachycardiac) d/w resident.

## 2024-05-28 NOTE — H&P ADULT - NSHPROSALLOTHERNEGRD_GEN_ALL_CORE
Patient is a 74y old  Male who presents with a chief complaint of FTT (29 Sep 2023 10:03)    Date of servie : 09-29-23 @ 15:09  INTERVAL HPI/OVERNIGHT EVENTS:  T(C): 36.5 (09-29-23 @ 13:00), Max: 36.7 (09-28-23 @ 17:58)  HR: 100 (09-29-23 @ 13:00) (80 - 100)  BP: 110/88 (09-29-23 @ 13:00) (110/88 - 143/84)  RR: 18 (09-29-23 @ 13:00) (18 - 18)  SpO2: 100% (09-29-23 @ 13:00) (100% - 100%)  Wt(kg): --  I&O's Summary      LABS:                        10.7   3.45  )-----------( Clumped    ( 29 Sep 2023 06:38 )             31.4     09-29    139  |  104  |  2<L>  ----------------------------<  70  4.0   |  26  |  0.59    Ca    8.0<L>      29 Sep 2023 06:38  Phos  2.4     09-29  Mg     1.70     09-29        Urinalysis Basic - ( 29 Sep 2023 06:38 )    Color: x / Appearance: x / SG: x / pH: x  Gluc: 70 mg/dL / Ketone: x  / Bili: x / Urobili: x   Blood: x / Protein: x / Nitrite: x   Leuk Esterase: x / RBC: x / WBC x   Sq Epi: x / Non Sq Epi: x / Bacteria: x      CAPILLARY BLOOD GLUCOSE      POCT Blood Glucose.: 87 mg/dL (29 Sep 2023 12:37)  POCT Blood Glucose.: 85 mg/dL (29 Sep 2023 09:38)  POCT Blood Glucose.: 62 mg/dL (29 Sep 2023 06:21)  POCT Blood Glucose.: 66 mg/dL (29 Sep 2023 06:20)  POCT Blood Glucose.: 74 mg/dL (29 Sep 2023 00:45)  POCT Blood Glucose.: 93 mg/dL (28 Sep 2023 17:52)        Urinalysis Basic - ( 29 Sep 2023 06:38 )    Color: x / Appearance: x / SG: x / pH: x  Gluc: 70 mg/dL / Ketone: x  / Bili: x / Urobili: x   Blood: x / Protein: x / Nitrite: x   Leuk Esterase: x / RBC: x / WBC x   Sq Epi: x / Non Sq Epi: x / Bacteria: x        MEDICATIONS  (STANDING):  amLODIPine   Tablet 10 milliGRAM(s) Oral daily  chlorhexidine 2% Cloths 1 Application(s) Topical daily  collagenase Ointment 1 Application(s) Topical daily  dextrose 10%. 1000 milliLiter(s) (50 mL/Hr) IV Continuous <Continuous>  dextrose 50% Injectable 25 Gram(s) IV Push once  dextrose 50% Injectable 12.5 Gram(s) IV Push once  dextrose 50% Injectable 25 Gram(s) IV Push once  dextrose 50% Injectable 25 Gram(s) IV Push once  dextrose Oral Gel 15 Gram(s) Oral once  enoxaparin Injectable 65 milliGRAM(s) SubCutaneous every 12 hours  furosemide   Injectable 20 milliGRAM(s) IV Push once  glucagon  Injectable 1 milliGRAM(s) IntraMuscular once  levETIRAcetam  IVPB 500 milliGRAM(s) IV Intermittent every 12 hours  levothyroxine Injectable 70 MICROGram(s) IV Push <User Schedule>  multivitamin 1 Tablet(s) Oral daily  mupirocin 2% Ointment 1 Application(s) Topical two times a day  potassium phosphate IVPB 15 milliMole(s) IV Intermittent once  QUEtiapine 75 milliGRAM(s) Oral at bedtime  thiamine 100 milliGRAM(s) Oral daily  valproate sodium  IVPB 500 milliGRAM(s) IV Intermittent every 6 hours    MEDICATIONS  (PRN):          PHYSICAL EXAM:  GENERAL: frail  CHEST/LUNG: Clear to percussion bilaterally; No rales, rhonchi, wheezing, or rubs  HEART: Regular rate and rhythm; No murmurs, rubs, or gallops  ABDOMEN: Soft, Nontender, Nondistended; Bowel sounds present  EXTREMITIES:  edema +    Care Discussed with Consultants/Other Providers [ ] YES  [ ] NO All other review of systems negative, except as noted in HPI

## 2024-05-28 NOTE — CONSULT NOTE ADULT - SUBJECTIVE AND OBJECTIVE BOX
HISTORY OF PRESENTING ILLNESS:    HPI:  a 74-year-old woman with a history of hypertension, diabetes, CKD, schizophrenia, DVT on Lovenox, surgical history of oophorectomy, resident of [facility], brought in by EMS for abdominal pain.  She is complaining from right lower abdominal pain, described as burning that started yesterday, associated with progressively enlarging mass at the same site. She denied any fever, chills, diarrhea, constipation, nausea or vomiting.     Vital Signs T(F): 98.8 HR: 89 BP: 138/80 RR: 19 SpO2: 98%     CT Abdomen and Pelvis w/ IV Cont IMPRESSION: Patent anterior abdominal wall 16.3 x 9.0 x 9.0 cm collection with layering hyperdensities and surrounding soft tissue stranding. Findings may reflect hematoma in appropriate clinical setting. Additional anterior abdominal wall subcutaneous soft tissue nodular densities, possibly reflecting injection granulomas/hematomas. Left lower lobe 0.5 cm pulmonary nodule.    Surgery consulted, their impression is hematoma, admitted for further evaluation and management            (28 May 2024 00:15)    PAST MEDICAL & SURGICAL HISTORY  PAST MEDICAL & SURGICAL HISTORY:  Diabetes mellitus      Hypertension      Type 2 myocardial infarction due to hypertension      Stage 4 chronic kidney disease      Schizophrenia      MDD (major depressive disorder)      Constipation      History of bilateral oophorectomies        SOCIAL HISTORY:    ALLERGIES:  haloperidol (Unknown)    MEDICATIONS:  STANDING MEDICATIONS  acetaminophen     Tablet .. 1000 milliGRAM(s) Oral every 8 hours  ascorbic acid 500 milliGRAM(s) Oral daily  atorvastatin 80 milliGRAM(s) Oral at bedtime  cholecalciferol 2000 Unit(s) Oral daily  ferrous    sulfate 325 milliGRAM(s) Oral daily  hydrALAZINE 25 milliGRAM(s) Oral daily  hydrochlorothiazide 25 milliGRAM(s) Oral daily  isosorbide   dinitrate Tablet (ISORDIL) 10 milliGRAM(s) Oral three times a day  mirtazapine 15 milliGRAM(s) Oral at bedtime  multivitamin 1 Tablet(s) Oral daily  senna 2 Tablet(s) Oral at bedtime  sodium zirconium cyclosilicate 10 Gram(s) Oral once    PRN MEDICATIONS  oxyCODONE    IR 2.5 milliGRAM(s) Oral every 4 hours PRN    VITALS:   T(F): 97.5  HR: 91  BP: 90/56  RR: 18  SpO2: 98%    LABS:                        7.3    7.74  )-----------( 260      ( 28 May 2024 00:45 )             23.5     05-28    135  |  104  |  44<H>  ----------------------------<  126<H>  5.0   |  20  |  1.5    Ca    8.3<L>      28 May 2024 00:45    TPro  7.5  /  Alb  4.1  /  TBili  <0.2  /  DBili  x   /  AST  14  /  ALT  6   /  AlkPhos  68  05-27    PT/INR - ( 27 May 2024 19:15 )   PT: 14.10 sec;   INR: 1.23 ratio         PTT - ( 27 May 2024 19:15 )  PTT:44.0 sec  Urinalysis Basic - ( 28 May 2024 00:45 )    Color: x / Appearance: x / SG: x / pH: x  Gluc: 126 mg/dL / Ketone: x  / Bili: x / Urobili: x   Blood: x / Protein: x / Nitrite: x   Leuk Esterase: x / RBC: x / WBC x   Sq Epi: x / Non Sq Epi: x / Bacteria: x            Urinalysis with Rflx Culture (collected 27 May 2024 20:02)            
GENERAL SURGERY CONSULT NOTE    Patient: VANGIE SEBASTIAN , 74y ([birth year])Female   MRN: [id number]  Location: [facility] ED  Visit: 05-27-24 Emergency  Date: 05-27-24 @ 18:55    HPI:  74-year-old woman with a history of hypertension, diabetes, CKD, schizophrenia, renal cell carcinoma, DVT on Lovenox, surgical history of oophorectomy, resident of [facility], brought in by EMS for abdominal pain.  Patient states that the right lower quadrant of her abdomen has been burning since yesterday. [facility] sent her to ED for evaluation. Denies fever, chills, bleeding, draining from area. Denies dizziness of shortness of breath.     PAST MEDICAL & SURGICAL HISTORY:  oophorectomy     Home Medications:        VITALS:  T(F): 99 (05-27-24 @ 17:35), Max: 99 (05-27-24 @ 17:35)  HR: 101 (05-27-24 @ 17:35) (101 - 101)  BP: 119/79 (05-27-24 @ 17:35) (119/79 - 119/79)  RR: 18 (05-27-24 @ 17:35) (18 - 18)  SpO2: 98% (05-27-24 @ 17:35) (98% - 98%)    PHYSICAL EXAM:  General: NAD, AAOx3, calm and cooperative  HEENT: NCAT, HARISH, EOMI, Trachea ML, Neck supple  Cardiac: RRR S1, S2, no Murmurs, rubs or gallops  Respiratory: CTAB, normal respiratory effort  Abdomen: Soft, non-distended, non-tender,  Vascular: Pulses 2+ throughout, extremities well perfused  Skin: Warm/dry, normal color, no jaundice  Incision/wound: right lower abdomen with large area of ecchymosis and induration. Firm to touch. No skin breakdown or wounds overlying area. no fluctuance or crepitus     MEDICATIONS  (STANDING):  piperacillin/tazobactam IVPB... 3.375 Gram(s) IV Intermittent once  vancomycin  IVPB 1250 milliGRAM(s) IV Intermittent Once    MEDICATIONS  (PRN):      LAB/STUDIES:                        8.6    8.16  )-----------( 332      ( 27 May 2024 18:20 )             27.6     IMAGING:  < from: CT Abdomen and Pelvis w/ IV Cont (05.27.24 @ 18:26) >  Patent anterior abdominal wall 16.3 x 9.0 x 9.0 cm collection with   layering hyperdensities and surrounding soft tissue stranding. Findings   may reflect hematoma in appropriate clinical setting. Additional anterior   abdominal wall subcutaneous soft tissue nodular densities, possibly   reflecting injection granulomas/hematomas.    Left lower lobe 0.5 cm pulmonary nodule.    < end of copied text >        A

## 2024-05-28 NOTE — H&P ADULT - NSICDXPASTMEDICALHX_GEN_ALL_CORE_FT
PAST MEDICAL HISTORY:  Constipation     Diabetes mellitus     Hypertension     MDD (major depressive disorder)     Schizophrenia     Stage 4 chronic kidney disease     Type 2 myocardial infarction due to hypertension

## 2024-05-28 NOTE — PATIENT PROFILE ADULT - FALL HARM RISK - HARM RISK INTERVENTIONS

## 2024-05-28 NOTE — PROGRESS NOTE ADULT - ASSESSMENT
74-year-old woman with a history of hypertension, diabetes, CKD, schizophrenia, renal cell carcinoma, DVT on Lovenox, surgical history of oophorectomy, resident of [facility], brought in by EMS for abdominal pain.  Afebrile with normal WBC. CT scan shows anterior abdominal wall collection reflecting hematoma.     Plan:   -No acute surgical intervention   -Needs abdominal binder  -Daily CBC  -Restart AC when Hgb is stable  -IR consult for possible drainage and/or IVC filter in setting of DVT    Above to be discussed with attending, Dr Szymanski.

## 2024-05-28 NOTE — H&P ADULT - ASSESSMENT
a 74-year-old woman with a history of hypertension, diabetes, CKD, schizophrenia, DVT on Lovenox, surgical history of oophorectomy, resident of [facility], brought in by EMS for abdominal pain.     a 74-year-old woman with a history of hypertension, diabetes, CKD, schizophrenia, DVT on Lovenox, surgical history of oophorectomy, resident of [facility], brought in by EMS for abdominal pain.      # Abdominal wall mass   # Probably hematoma, less likely infected   - Complaining from abdominal pain   - Labs: Hb 8.6, WBC 8K   - CT Abdomen and Pelvis w/ IV Cont IMPRESSION: Patent anterior abdominal wall 16.3 x 9.0 x 9.0 cm collection with layering hyperdensities and surrounding soft tissue stranding. Findings may reflect hematoma in appropriate clinical setting. Additional anterior abdominal wall subcutaneous soft tissue nodular densities, possibly reflecting injection granulomas/hematomas. Left lower lobe 0.5 cm pulmonary nodule.  - Surgery on board, impression is hematoma: IR consult for possible drainage and/or IVC filter in setting of DVT  - Abdominal binder  - Hold AC and aspirin (AC for DVT, aspirin for atherosclerotic heart disease per the chart but no mention for stents)   - Pain control with Tylenol and PRN oxycodone   - Trend CBC  - IR consult for possible embolization VS drainage   - Follow up blood cultures, monitor off antibiotics        # CKD stage 4  # Hyperkalemia   # HTN   # HLD  - Cr 1.7   - S/P two doses of Lokelma   - Continue home hydralazine, HCTZ, pravastatin, isosorbide dinitrate   - Monitor KFT, K level       # Constipation  - start senna     ** The patient is taking isoniazid 50X2, no available reason in the charts for this, mentioned as prophylaxis?, will hold for now as the patient is complaining from some numbness  ** Get [facility] records to check DVT date, isoniazid indication    # GI prophylaxis: none  # DVT prophylaxis: none (possible abdominal wall hematoma)   # Full code  # Diet: NPO after midnight for possible procedure

## 2024-05-29 LAB
ALBUMIN SERPL ELPH-MCNC: 3.4 G/DL — LOW (ref 3.5–5.2)
ALP SERPL-CCNC: 43 U/L — SIGNIFICANT CHANGE UP (ref 30–115)
ALT FLD-CCNC: <5 U/L — SIGNIFICANT CHANGE UP (ref 0–41)
ANION GAP SERPL CALC-SCNC: 14 MMOL/L — SIGNIFICANT CHANGE UP (ref 7–14)
AST SERPL-CCNC: 11 U/L — SIGNIFICANT CHANGE UP (ref 0–41)
BASOPHILS # BLD AUTO: 0.02 K/UL — SIGNIFICANT CHANGE UP (ref 0–0.2)
BASOPHILS NFR BLD AUTO: 0.2 % — SIGNIFICANT CHANGE UP (ref 0–1)
BILIRUB SERPL-MCNC: 0.5 MG/DL — SIGNIFICANT CHANGE UP (ref 0.2–1.2)
BUN SERPL-MCNC: 41 MG/DL — HIGH (ref 10–20)
CALCIUM SERPL-MCNC: 8.5 MG/DL — SIGNIFICANT CHANGE UP (ref 8.4–10.5)
CHLORIDE SERPL-SCNC: 105 MMOL/L — SIGNIFICANT CHANGE UP (ref 98–110)
CO2 SERPL-SCNC: 19 MMOL/L — SIGNIFICANT CHANGE UP (ref 17–32)
CREAT SERPL-MCNC: 1.3 MG/DL — SIGNIFICANT CHANGE UP (ref 0.7–1.5)
EGFR: 43 ML/MIN/1.73M2 — LOW
EOSINOPHIL # BLD AUTO: 0.04 K/UL — SIGNIFICANT CHANGE UP (ref 0–0.7)
EOSINOPHIL NFR BLD AUTO: 0.4 % — SIGNIFICANT CHANGE UP (ref 0–8)
GLUCOSE BLDC GLUCOMTR-MCNC: 109 MG/DL — HIGH (ref 70–99)
GLUCOSE BLDC GLUCOMTR-MCNC: 115 MG/DL — HIGH (ref 70–99)
GLUCOSE BLDC GLUCOMTR-MCNC: 135 MG/DL — HIGH (ref 70–99)
GLUCOSE BLDC GLUCOMTR-MCNC: 140 MG/DL — HIGH (ref 70–99)
GLUCOSE SERPL-MCNC: 103 MG/DL — HIGH (ref 70–99)
HCT VFR BLD CALC: 24.8 % — LOW (ref 37–47)
HCT VFR BLD CALC: 24.8 % — LOW (ref 37–47)
HGB BLD-MCNC: 8.5 G/DL — LOW (ref 12–16)
HGB BLD-MCNC: 8.5 G/DL — LOW (ref 12–16)
IMM GRANULOCYTES NFR BLD AUTO: 0.5 % — HIGH (ref 0.1–0.3)
LYMPHOCYTES # BLD AUTO: 1.77 K/UL — SIGNIFICANT CHANGE UP (ref 1.2–3.4)
LYMPHOCYTES # BLD AUTO: 16.9 % — LOW (ref 20.5–51.1)
MAGNESIUM SERPL-MCNC: 1.9 MG/DL — SIGNIFICANT CHANGE UP (ref 1.8–2.4)
MCHC RBC-ENTMCNC: 28.5 PG — SIGNIFICANT CHANGE UP (ref 27–31)
MCHC RBC-ENTMCNC: 29.1 PG — SIGNIFICANT CHANGE UP (ref 27–31)
MCHC RBC-ENTMCNC: 34.3 G/DL — SIGNIFICANT CHANGE UP (ref 32–37)
MCHC RBC-ENTMCNC: 34.3 G/DL — SIGNIFICANT CHANGE UP (ref 32–37)
MCV RBC AUTO: 83.2 FL — SIGNIFICANT CHANGE UP (ref 81–99)
MCV RBC AUTO: 84.9 FL — SIGNIFICANT CHANGE UP (ref 81–99)
MONOCYTES # BLD AUTO: 1.06 K/UL — HIGH (ref 0.1–0.6)
MONOCYTES NFR BLD AUTO: 10.1 % — HIGH (ref 1.7–9.3)
NEUTROPHILS # BLD AUTO: 7.55 K/UL — HIGH (ref 1.4–6.5)
NEUTROPHILS NFR BLD AUTO: 71.9 % — SIGNIFICANT CHANGE UP (ref 42.2–75.2)
NRBC # BLD: 0 /100 WBCS — SIGNIFICANT CHANGE UP (ref 0–0)
NRBC # BLD: 0 /100 WBCS — SIGNIFICANT CHANGE UP (ref 0–0)
PLATELET # BLD AUTO: 224 K/UL — SIGNIFICANT CHANGE UP (ref 130–400)
PLATELET # BLD AUTO: 238 K/UL — SIGNIFICANT CHANGE UP (ref 130–400)
PMV BLD: 10.1 FL — SIGNIFICANT CHANGE UP (ref 7.4–10.4)
PMV BLD: 9.8 FL — SIGNIFICANT CHANGE UP (ref 7.4–10.4)
POTASSIUM SERPL-MCNC: 4.7 MMOL/L — SIGNIFICANT CHANGE UP (ref 3.5–5)
POTASSIUM SERPL-SCNC: 4.7 MMOL/L — SIGNIFICANT CHANGE UP (ref 3.5–5)
PROT SERPL-MCNC: 6 G/DL — SIGNIFICANT CHANGE UP (ref 6–8)
RBC # BLD: 2.92 M/UL — LOW (ref 4.2–5.4)
RBC # BLD: 2.98 M/UL — LOW (ref 4.2–5.4)
RBC # FLD: 14.8 % — HIGH (ref 11.5–14.5)
RBC # FLD: 15.1 % — HIGH (ref 11.5–14.5)
SODIUM SERPL-SCNC: 138 MMOL/L — SIGNIFICANT CHANGE UP (ref 135–146)
WBC # BLD: 10.07 K/UL — SIGNIFICANT CHANGE UP (ref 4.8–10.8)
WBC # BLD: 10.49 K/UL — SIGNIFICANT CHANGE UP (ref 4.8–10.8)
WBC # FLD AUTO: 10.07 K/UL — SIGNIFICANT CHANGE UP (ref 4.8–10.8)
WBC # FLD AUTO: 10.49 K/UL — SIGNIFICANT CHANGE UP (ref 4.8–10.8)

## 2024-05-29 PROCEDURE — 99231 SBSQ HOSP IP/OBS SF/LOW 25: CPT

## 2024-05-29 PROCEDURE — 99232 SBSQ HOSP IP/OBS MODERATE 35: CPT

## 2024-05-29 RX ORDER — SODIUM CHLORIDE 9 MG/ML
1000 INJECTION INTRAMUSCULAR; INTRAVENOUS; SUBCUTANEOUS
Refills: 0 | Status: DISCONTINUED | OUTPATIENT
Start: 2024-05-29 | End: 2024-06-02

## 2024-05-29 RX ORDER — PANTOPRAZOLE SODIUM 20 MG/1
40 TABLET, DELAYED RELEASE ORAL
Refills: 0 | Status: DISCONTINUED | OUTPATIENT
Start: 2024-05-29 | End: 2024-06-02

## 2024-05-29 RX ORDER — SODIUM ZIRCONIUM CYCLOSILICATE 10 G/10G
10 POWDER, FOR SUSPENSION ORAL ONCE
Refills: 0 | Status: COMPLETED | OUTPATIENT
Start: 2024-05-29 | End: 2024-05-29

## 2024-05-29 RX ADMIN — SODIUM ZIRCONIUM CYCLOSILICATE 10 GRAM(S): 10 POWDER, FOR SUSPENSION ORAL at 05:23

## 2024-05-29 RX ADMIN — Medication 1000 MILLIGRAM(S): at 22:01

## 2024-05-29 RX ADMIN — ISOSORBIDE DINITRATE 10 MILLIGRAM(S): 5 TABLET ORAL at 11:13

## 2024-05-29 RX ADMIN — SODIUM ZIRCONIUM CYCLOSILICATE 10 GRAM(S): 10 POWDER, FOR SUSPENSION ORAL at 11:13

## 2024-05-29 RX ADMIN — Medication 1 TABLET(S): at 11:13

## 2024-05-29 RX ADMIN — Medication 1000 MILLIGRAM(S): at 21:09

## 2024-05-29 RX ADMIN — ATORVASTATIN CALCIUM 80 MILLIGRAM(S): 80 TABLET, FILM COATED ORAL at 21:08

## 2024-05-29 RX ADMIN — Medication 2000 UNIT(S): at 11:13

## 2024-05-29 RX ADMIN — Medication 1000 MILLIGRAM(S): at 14:17

## 2024-05-29 RX ADMIN — Medication 25 MILLIGRAM(S): at 05:21

## 2024-05-29 RX ADMIN — MIRTAZAPINE 15 MILLIGRAM(S): 45 TABLET, ORALLY DISINTEGRATING ORAL at 21:09

## 2024-05-29 RX ADMIN — Medication 1000 MILLIGRAM(S): at 05:20

## 2024-05-29 RX ADMIN — SODIUM CHLORIDE 100 MILLILITER(S): 9 INJECTION INTRAMUSCULAR; INTRAVENOUS; SUBCUTANEOUS at 12:51

## 2024-05-29 RX ADMIN — Medication 500 MILLIGRAM(S): at 11:13

## 2024-05-29 RX ADMIN — SENNA PLUS 2 TABLET(S): 8.6 TABLET ORAL at 21:08

## 2024-05-29 RX ADMIN — Medication 1000 MILLIGRAM(S): at 13:17

## 2024-05-29 NOTE — PROGRESS NOTE ADULT - ASSESSMENT
ASSESSMENT:  74-year-old woman with a history of hypertension, diabetes, CKD, schizophrenia, renal cell carcinoma, DVT on Lovenox, surgical history of oophorectomy, resident of [facility], brought in by EMS for abdominal pain.  Afebrile with normal WBC. CT scan shows anterior abdominal wall collection reflecting hematoma.     PLAN:  - No acute surgical intervention   - Needs abdominal binder  - Trend H/H, F/U post-transfusion Hb  - Restart AC when Hb is stable  - No DVT in either lower extremity, F/U IR plans    [phone]

## 2024-05-29 NOTE — PROGRESS NOTE ADULT - SUBJECTIVE AND OBJECTIVE BOX
VANGIE SEBASTIAN  74y  [facility]      Patient is a 74y old  Female who presents with a chief complaint of abdominal wall hematoma (29 May 2024 05:12)      INTERVAL HPI/OVERNIGHT EVENTS:        REVIEW OF SYSTEMS:        FAMILY HISTORY:    T(C): 37.3 (05-29-24 @ 04:54), Max: 37.6 (05-28-24 @ 21:24)  HR: 89 (05-29-24 @ 04:54) (86 - 103)  BP: 107/75 (05-29-24 @ 04:54) (92/64 - 126/76)  RR: 18 (05-29-24 @ 04:54) (18 - 18)  SpO2: 98% (05-29-24 @ 04:54) (95% - 99%)  Wt(kg): --Vital Signs Last 24 Hrs  T(C): 37.3 (29 May 2024 04:54), Max: 37.6 (28 May 2024 21:24)  T(F): 99.2 (29 May 2024 04:54), Max: 99.6 (28 May 2024 21:24)  HR: 89 (29 May 2024 04:54) (86 - 103)  BP: 107/75 (29 May 2024 04:54) (92/64 - 126/76)  BP(mean): 89 (29 May 2024 04:54) (73 - 97)  RR: 18 (29 May 2024 04:54) (18 - 18)  SpO2: 98% (29 May 2024 04:54) (95% - 99%)    Parameters below as of 29 May 2024 04:54  Patient On (Oxygen Delivery Method): room air        PHYSICAL EXAM:  GENERAL: NAD, well-groomed, well-developed  HEAD:  Atraumatic, Normocephalic  EYES: EOMI, PERRLA, conjunctiva and sclera clear  ENMT: No tonsillar erythema, exudates, or enlargement; Moist mucous membranes, Good dentition, No lesions  NECK: Supple, No JVD, Normal thyroid  NERVOUS SYSTEM:  Alert & Oriented X3, Good concentration; Motor Strength 5/5 B/L upper and lower extremities; DTRs 2+ intact and symmetric  PULM: Clear to auscultation bilaterally  CARDIAC: Regular rate and rhythm; No murmurs, rubs, or gallops  GI: Soft, Nontender, Nondistended; Bowel sounds present  EXTREMITIES:  2+ Peripheral Pulses, No clubbing, cyanosis, or edema  LYMPH: No lymphadenopathy noted  SKIN: No rashes or lesions    Consultant(s) Notes Reviewed:  [x ] YES  [ ] NO  Care Discussed with Consultants/Other Providers [ x] YES  [ ] NO    LABS:                            5.1    6.52  )-----------( 226      ( 28 May 2024 16:55 )             16.2   05-28    133<L>  |  103  |  44<H>  ----------------------------<  295<H>  5.6<H>   |  20  |  1.6<H>    Ca    7.9<L>      28 May 2024 16:55  Mg     1.8     05-28    TPro  6.1  /  Alb  3.5  /  TBili  0.2  /  DBili  x   /  AST  10  /  ALT  <5  /  AlkPhos  49  05-28            Urinalysis with Rflx Culture (collected 27 May 2024 20:02)    Culture - Blood (collected 27 May 2024 18:20)  Source: .Blood Blood-Peripheral  Preliminary Report (29 May 2024 04:02):    No growth at 24 hours    Culture - Blood (collected 27 May 2024 18:20)  Source: .Blood Blood-Peripheral  Preliminary Report (29 May 2024 04:02):    No growth at 24 hours      acetaminophen     Tablet .. 1000 milliGRAM(s) Oral every 8 hours  ascorbic acid 500 milliGRAM(s) Oral daily  atorvastatin 80 milliGRAM(s) Oral at bedtime  cholecalciferol 2000 Unit(s) Oral daily  ferrous    sulfate 325 milliGRAM(s) Oral daily  hydrALAZINE 25 milliGRAM(s) Oral daily  hydrochlorothiazide 25 milliGRAM(s) Oral daily  isosorbide   dinitrate Tablet (ISORDIL) 10 milliGRAM(s) Oral three times a day  mirtazapine 15 milliGRAM(s) Oral at bedtime  multivitamin 1 Tablet(s) Oral daily  oxyCODONE    IR 2.5 milliGRAM(s) Oral every 4 hours PRN  senna 2 Tablet(s) Oral at bedtime        This is a 73 yo F from  w/PMH of HTN, DM, CKD, schizophrenia, DVT on Lovenox, and surgical history of oophorectomy who was brought in by EMS for burning right lower abdominal pain that started the day before presentation with enlarging mass at the same site, now admitted to medicine for work up and management of abdominal wall hematoma.    1. Abdominal pain due to Abdominal wall mass due to hematoma, less likely infected complicated by acute blood loss anemia s/p 2 PRBC   - Abdominal binder          - UA negative  - Hold AC and aspirin (AC for DVT, aspirin for atherosclerotic heart disease per the chart but no mention for stents)   - Pain control with Tylenol and PRN oxycodone  - BCx x 2 (5/27) NGTD  - BL LE venous duplex (5/28): No evidence of deep venous thrombosis in either lower extremity  - CT Abdomen and Pelvis w/ IV Cont IMPRESSION:   a) Patent anterior abdominal wall 16.3 x 9.0 x 9.0 cm collection with layering hyperdensities and surrounding soft tissue stranding. Findings may reflect hematoma in appropriate clinical setting. Additional anterior abdominal wall subcutaneous soft tissue nodular densities, possibly reflecting injection granulomas/hematomas. Left lower lobe 0.5 cm pulmonary nodule.  - Surgery on board, impression is hematoma:   a) no surgical intervention needed, HOLD AC, restart when hgb is stable  - Hgb 8.6 (5/27) -> 6.7 (5/28) -> 5.1 (5/28), now s/p 2 u pRBC, f/u post transfusion hgb  - IR consult recs (5/28): No acute IR intervention because no active bleed demonstrated. Hold lovenox. Monitor H&H and transfuse PRN. Recommend bilateral lower extremity duplex to determine whether pt a candidate for IVC filter.      2. CKD stage 4 with  Hyperkalemia   - S/P two doses of Lokelma   - s/p insulin/dextrose (5/28)      3. HTN / HLD  - Continue home hydralazine, HCTZ, pravastatin, isosorbide dinitrate     4. Constipation  - Cont laxative     ** The patient is taking isoniazid 50X2, no available reason in the charts for this, mentioned as prophylaxis?, will hold for now as the patient is complaining from some numbness  ** Get [facility] records to check DVT date, isoniazid indication    #MISC  -  GI prophylaxis: ppi   -  DVT prophylaxis: scd   -  Full code  -  Diet: NPO after midnight for possible procedure      VANGIE SEBASTIAN  74y  [facility] [address] [address]      Patient is a 74y old  Female who presents with a chief complaint of abdominal wall hematoma (29 May 2024 05:12)      INTERVAL HPI/OVERNIGHT EVENTS:  Patient feels well  Still with abdominal pain.   no new complains  no overnight events  stated that she is been getting lovenox injection for a yr?         FAMILY HISTORY:    T(C): 37.3 (05-29-24 @ 04:54), Max: 37.6 (05-28-24 @ 21:24)  HR: 89 (05-29-24 @ 04:54) (86 - 103)  BP: 107/75 (05-29-24 @ 04:54) (92/64 - 126/76)  RR: 18 (05-29-24 @ 04:54) (18 - 18)  SpO2: 98% (05-29-24 @ 04:54) (95% - 99%)  Wt(kg): --Vital Signs Last 24 Hrs  T(C): 37.3 (29 May 2024 04:54), Max: 37.6 (28 May 2024 21:24)  T(F): 99.2 (29 May 2024 04:54), Max: 99.6 (28 May 2024 21:24)  HR: 89 (29 May 2024 04:54) (86 - 103)  BP: 107/75 (29 May 2024 04:54) (92/64 - 126/76)  BP(mean): 89 (29 May 2024 04:54) (73 - 97)  RR: 18 (29 May 2024 04:54) (18 - 18)  SpO2: 98% (29 May 2024 04:54) (95% - 99%)    Parameters below as of 29 May 2024 04:54  Patient On (Oxygen Delivery Method): room air        PHYSICAL EXAM:  GENERAL: NAD, well-groomed, well-developed  PULM: Clear to auscultation bilateral  GI: Soft, Nontender, distended; Bowel sounds present  EXTREMITIES:  2+ Peripheral Pulse    Consultant(s) Notes Reviewed:  [x ] YES  [ ] NO  Care Discussed with Consultants/Other Providers [ x] YES  [ ] NO    LABS:                            5.1    6.52  )-----------( 226      ( 28 May 2024 16:55 )             16.2   05-28    133<L>  |  103  |  44<H>  ----------------------------<  295<H>  5.6<H>   |  20  |  1.6<H>    Ca    7.9<L>      28 May 2024 16:55  Mg     1.8     05-28    TPro  6.1  /  Alb  3.5  /  TBili  0.2  /  DBili  x   /  AST  10  /  ALT  <5  /  AlkPhos  49  05-28            Urinalysis with Rflx Culture (collected 27 May 2024 20:02)    Culture - Blood (collected 27 May 2024 18:20)  Source: .Blood Blood-Peripheral  Preliminary Report (29 May 2024 04:02):    No growth at 24 hours    Culture - Blood (collected 27 May 2024 18:20)  Source: .Blood Blood-Peripheral  Preliminary Report (29 May 2024 04:02):    No growth at 24 hours      acetaminophen     Tablet .. 1000 milliGRAM(s) Oral every 8 hours  ascorbic acid 500 milliGRAM(s) Oral daily  atorvastatin 80 milliGRAM(s) Oral at bedtime  cholecalciferol 2000 Unit(s) Oral daily  ferrous    sulfate 325 milliGRAM(s) Oral daily  hydrALAZINE 25 milliGRAM(s) Oral daily  hydrochlorothiazide 25 milliGRAM(s) Oral daily  isosorbide   dinitrate Tablet (ISORDIL) 10 milliGRAM(s) Oral three times a day  mirtazapine 15 milliGRAM(s) Oral at bedtime  multivitamin 1 Tablet(s) Oral daily  oxyCODONE    IR 2.5 milliGRAM(s) Oral every 4 hours PRN  senna 2 Tablet(s) Oral at bedtime        This is a 75 yo F from  w/PMH of HTN, DM, CKD, schizophrenia, DVT on Lovenox, and surgical history of oophorectomy who was brought in by EMS for burning right lower abdominal pain that started the day before presentation with enlarging mass at the same site, now admitted to medicine for work up and management of abdominal wall hematoma.    1. Abdominal pain due to Abdominal wall mass due to hematoma, less likely infected complicated by acute blood loss anemia s/p 2 PRBC   - Abdominal binder          - UA negative  - Hold AC and aspirin (AC for DVT, aspirin for atherosclerotic heart disease per the chart)  * pt stated that she was getting lovenox for a yr. now. Getting records   - Pain control with Tylenol and PRN oxycodone  - BCx x 2 (5/27) NGTD  - BL LE venous duplex (5/28): No evidence of deep venous thrombosis in either lower extremity  - CT Abdomen and Pelvis w/ IV Cont IMPRESSION:   a) Patent anterior abdominal wall 16.3 x 9.0 x 9.0 cm collection with layering hyperdensities and surrounding soft tissue stranding. Findings may reflect hematoma in appropriate clinical setting. Additional anterior abdominal wall subcutaneous soft tissue nodular densities, possibly reflecting injection granulomas/hematomas. Left lower lobe 0.5 cm pulmonary nodule.  - Surgery on board, impression is hematoma:   a) no surgical intervention needed, HOLD AC, restart when hgb is stable  - Hgb 8.6 (5/27) -> 6.7 (5/28) -> 5.1 (5/28), now s/p 2 u pRBC, f/u post transfusion hgb  - IR consult recs (5/28): No acute IR intervention because no active bleed demonstrated. Hold lovenox. Monitor H&H and transfuse PRN. Recommend bilateral lower extremity duplex to determine whether pt a candidate for IVC filter.      2. Acute kidney injury on top of ckd stage 3b resolving complicated by hyperkalemia  - S/P two doses of Lokelma   - s/p insulin/dextrose (5/28)  - Trial of NS at 100ml/hr for 5 hours (BUN/cr still elevated)       3. HTN / HLD  - Stop hydralazine it was ordered daily   - Stop HCTZ (bp seems overcontrolled)   - Cont  pravastatin,   - Cont isosorbide dinitrate     4. Constipation  - Cont laxative     ** The patient is taking isoniazid 50X2, no available reason in the charts for this, mentioned as prophylaxis?, will hold for now as the patient is complaining from some numbness  ** Get [facility] records to check DVT date, isoniazid indication    #MISC  -  GI prophylaxis: ppi   -  DVT prophylaxis: scd   -  Full code    Acute blood loss anemia due to abdominal wall hematoma in a pt on therapeutic lovenox ?. getting records to check what was the indication and why lovenox   TRACY on top of ckd. IVF today. bun/cr still elevated. less likely due to hematoma  HTN overcontrolled. stop hctz and hydralazine

## 2024-05-29 NOTE — PROGRESS NOTE ADULT - SUBJECTIVE AND OBJECTIVE BOX
GENERAL SURGERY PROGRESS NOTE     VANGIE SEBASTIAN  Female  Hospital day :3d    OVERNIGHT EVENTS:  - Hb 6.4 -> 5.1 -> 2u pRBC given  - NPO except meds  - Holding anticoagulation    T(F): 98.2 (05-29-24 @ 00:30), Max: 99.6 (05-28-24 @ 21:24)  HR: 95 (05-29-24 @ 00:30) (86 - 103)  BP: 108/66 (05-29-24 @ 00:30) (90/56 - 126/76)  RR: 18 (05-29-24 @ 00:30) (18 - 18)  SpO2: 95% (05-29-24 @ 00:30) (95% - 99%)    DIET/FLUIDS: ascorbic acid 500 milliGRAM(s) Oral daily  cholecalciferol 2000 Unit(s) Oral daily  ferrous    sulfate 325 milliGRAM(s) Oral daily  multivitamin 1 Tablet(s) Oral daily    PHYSICAL EXAM:  GENERAL: No acute distress, well-developed  CHEST/LUNG: CTAB; No wheezes, rales, or rhonchi  HEART: Regular rate and rhythm. Normal S1/S2. No murmurs, rubs, or gallops  ABDOMEN: Soft, non-tender, non-distended; right lower abdomen with large area of ecchymosis and induration. Firm to touch. No skin breakdown or wounds overlying area. no fluctuance or crepitus   EXTREMITIES: 2+ peripheral pulses b/l, No clubbing, cyanosis, or edema      LABS  Labs:  CAPILLARY BLOOD GLUCOSE  POCT Blood Glucose.: 124 mg/dL (28 May 2024 21:22)  POCT Blood Glucose.: 126 mg/dL (28 May 2024 17:41)  POCT Blood Glucose.: 211 mg/dL (28 May 2024 16:47)  POCT Blood Glucose.: 125 mg/dL (28 May 2024 16:01)  POCT Blood Glucose.: 116 mg/dL (28 May 2024 11:44)  POCT Blood Glucose.: 172 mg/dL (28 May 2024 08:27)                          5.1    6.52  )-----------( 226      ( 28 May 2024 16:55 )             16.2         05-28    133<L>  |  103  |  44<H>  ----------------------------<  295<H>  5.6<H>   |  20  |  1.6<H>      Calcium: 7.9 mg/dL (05-28-24 @ 16:55)      LFTs:             6.1  | 0.2  | 10       ------------------[49      ( 28 May 2024 11:04 )  3.5  | x    | <5           Blood Gas Venous - Lactate: 1.1 mmol/L (05-27-24 @ 17:55)      Coags:     14.10  ----< 1.23    ( 27 May 2024 19:15 )     44.0        Urinalysis Basic - ( 28 May 2024 16:55 )    Color: x / Appearance: x / SG: x / pH: x  Gluc: 295 mg/dL / Ketone: x  / Bili: x / Urobili: x   Blood: x / Protein: x / Nitrite: x   Leuk Esterase: x / RBC: x / WBC x   Sq Epi: x / Non Sq Epi: x / Bacteria: x        Urinalysis with Rflx Culture (collected 27 May 2024 20:02)          RADIOLOGY & ADDITIONAL TESTS:  < from: [facility] Duplex Lower Ext Vein Scan, Bilangelita (05.28.24 @ 11:39) >  IMPRESSION:  No evidence of deep venous thrombosis in either lower extremity.

## 2024-05-29 NOTE — PROGRESS NOTE ADULT - SUBJECTIVE AND OBJECTIVE BOX
----------Daily Progress Note----------    HISTORY OF PRESENT ILLNESS:  Patient is a 73 yo F from  /[facility] of HTN, DM, CKD, schizophrenia, DVT on Lovenox, and surgical history of oophorectomy who was brought in by EMS for burning right lower abdominal pain that started the day before presentation with enlarging mass at the same site without fever, chills, N/V, diarrhea, or constipation, w/ED vitals stable, labs notable for K 6.2, BUN 48, Cr 1.7, and Hgb 8.6, and CT AP notable for 16.3 cm abdominal mass, likely hematoma. Surgery was consulted in the ED, whose impression was hematoma and recommended no acute surgical intervention and IR consult for possible drainage. Patient is now admitted to medicine for work up and management of abdominal wall hematoma.     Today is hospital day 2d.     INTERVAL HOSPITAL COURSE / OVERNIGHT EVENTS:  O/N events:      24 hr events:      Patient was examined and seen at bedside.     Review of Systems: Otherwise unremarkable     <<<<<PAST MEDICAL & SURGICAL HISTORY>>>>>  Diabetes mellitus    Hypertension    Type 2 myocardial infarction due to hypertension    Stage 4 chronic kidney disease    Schizophrenia    MDD (major depressive disorder)    Constipation    History of bilateral oophorectomies      ALLERGIES  haloperidol (Unknown)      Home Medications:  ascorbic acid 500 mg oral tablet: 1 tab(s) orally once a day (28 May 2024 00:35)  aspirin 81 mg oral capsule: 1 cap(s) orally once a day (28 May 2024 00:35)  cholecalciferol 50 mcg (2000 intl units) oral tablet: 1 tab(s) orally once a day (28 May 2024 00:35)  docusate sodium 100 mg oral capsule: 2 cap(s) orally once a day (28 May 2024 00:35)  ferrous sulfate 325 mg (65 mg elemental iron) oral tablet: 1 tab(s) orally once a day (28 May 2024 00:35)  hydrALAZINE 25 mg oral tablet: 1 tab(s) orally once a day (28 May 2024 00:36)  hydroCHLOROthiazide 25 mg oral tablet: 1 tab(s) orally once a day (28 May 2024 00:36)  isoniazid 100 mg oral tablet: 0.5 tab(s) orally 2 times a day (28 May 2024 00:36)  isosorbide dinitrate 10 mg oral tablet: 1 tab(s) orally every 8 hours (28 May 2024 00:36)  Lovenox 80 mg/0.8 mL injectable solution: 80 milligram(s) subcutaneously 2 times a day (28 May 2024 00:36)  mirtazapine 15 mg oral tablet: 1 tab(s) orally once a day (at bedtime) (28 May 2024 00:36)  Multiple Vitamins oral tablet: 1 tab(s) orally once a day (28 May 2024 00:36)  pravastatin 80 mg oral tablet: 1 tab(s) orally once a day (at bedtime) (28 May 2024 00:36)        MEDICATIONS  STANDING MEDICATIONS  acetaminophen     Tablet .. 1000 milliGRAM(s) Oral every 8 hours  ascorbic acid 500 milliGRAM(s) Oral daily  atorvastatin 80 milliGRAM(s) Oral at bedtime  cholecalciferol 2000 Unit(s) Oral daily  ferrous    sulfate 325 milliGRAM(s) Oral daily  hydrALAZINE 25 milliGRAM(s) Oral daily  hydrochlorothiazide 25 milliGRAM(s) Oral daily  isosorbide   dinitrate Tablet (ISORDIL) 10 milliGRAM(s) Oral three times a day  mirtazapine 15 milliGRAM(s) Oral at bedtime  multivitamin 1 Tablet(s) Oral daily  senna 2 Tablet(s) Oral at bedtime  sodium zirconium cyclosilicate 10 Gram(s) Oral once    PRN MEDICATIONS  oxyCODONE    IR 2.5 milliGRAM(s) Oral every 4 hours PRN    VITALS:  T(F): 99.2  HR: 89  BP: 107/75  RR: 18  SpO2: 98%    <<<<<PHYSICAL EXAM>>>>>  GENERAL: Well developed, well nourished and in no acute distress. Resting comfortably in bed.  HEENT: Normocephalic, atraumatic, mucous membranes moist, EOMI, PERRLA, bilateral sclera anicteric, no conjunctival injection  Neck: Supple, non-tender, no lymphadenopathy.  PULMONARY: Clear to auscultation bilaterally. No rales, rhonchi, or wheezing.  CARDIOVASCULAR: Regular rate and rhythm, S1-S2, no murmurs  GASTROINTESTINAL: Soft, non-tender, non-distended, no guarding.  RENAL: No CVA tenderness.  SKIN/EXTREMITIES: No clubbing or edema  NEUROLOGIC/MUSCULOSKELETAL: AOx4, grossly moving all extremities, no focal deficits.    <<<<<LABS>>>>>                        5.1    6.52  )-----------( 226      ( 28 May 2024 16:55 )             16.2     05-28    133<L>  |  103  |  44<H>  ----------------------------<  295<H>  5.6<H>   |  20  |  1.6<H>    Ca    7.9<L>      28 May 2024 16:55  Mg     1.8     05-28    TPro  6.1  /  Alb  3.5  /  TBili  0.2  /  DBili  x   /  AST  10  /  ALT  <5  /  AlkPhos  49  05-28    PT/INR - ( 27 May 2024 19:15 )   PT: 14.10 sec;   INR: 1.23 ratio         PTT - ( 27 May 2024 19:15 )  PTT:44.0 sec  Urinalysis Basic - ( 28 May 2024 16:55 )    Color: x / Appearance: x / SG: x / pH: x  Gluc: 295 mg/dL / Ketone: x  / Bili: x / Urobili: x   Blood: x / Protein: x / Nitrite: x   Leuk Esterase: x / RBC: x / WBC x   Sq Epi: x / Non Sq Epi: x / Bacteria: x            Urinalysis with Rflx Culture (collected 27 May 2024 20:02)    Culture - Blood (collected 27 May 2024 18:20)  Source: .Blood Blood-Peripheral  Preliminary Report (29 May 2024 04:02):    No growth at 24 hours    Culture - Blood (collected 27 May 2024 18:20)  Source: .Blood Blood-Peripheral  Preliminary Report (29 May 2024 04:02):    No growth at 24 hours    [id number]        <<<<<RADIOLOGY>>>>>  CT AP IC (5/27): Patent anterior abdominal wall 16.3 x 9.0 x 9.0 cm collection with layering hyperdensities and surrounding soft tissue stranding. Findings may reflect hematoma in appropriate clinical setting. Additional anterior abdominal wall subcutaneous soft tissue nodular densities, possibly reflecting injection granulomas/hematomas. Left lower lobe 0.5 cm pulmonary nodule.    BL LE venous duplex (5/28): No evidence of deep venous thrombosis in either lower extremity.    ----------------------------------------------------------------------------------------------------------------------------------------------------------------------------------------------- ----------Daily Progress Note----------    HISTORY OF PRESENT ILLNESS:  Patient is a 75 yo F from  /[facility] of HTN, DM, CKD, schizophrenia, DVT on Lovenox, and surgical history of oophorectomy who was brought in by EMS for burning right lower abdominal pain that started the day before presentation with enlarging mass at the same site without fever, chills, N/V, diarrhea, or constipation, w/ED vitals stable, labs notable for K 6.2, BUN 48, Cr 1.7, and Hgb 8.6, and CT AP notable for 16.3 cm abdominal mass, likely hematoma. Surgery was consulted in the ED, whose impression was hematoma and recommended no acute surgical intervention and IR consult for possible drainage. Patient is now admitted to medicine for work up and management of abdominal wall hematoma.     Today is hospital day 2d.     INTERVAL HOSPITAL COURSE / OVERNIGHT EVENTS:  O/N events:  None    24 hr events:  None    Patient was examined and seen at bedside. Patient denies any abdominal pain at rest, denies any nausea, vomiting, or diarrhea, endorses good appetite, had 1 BM 3 days ago, denies any other symptoms    Review of Systems: Otherwise unremarkable     <<<<<PAST MEDICAL & SURGICAL HISTORY>>>>>  Diabetes mellitus    Hypertension    Type 2 myocardial infarction due to hypertension    Stage 4 chronic kidney disease    Schizophrenia    MDD (major depressive disorder)    Constipation    History of bilateral oophorectomies      ALLERGIES  haloperidol (Unknown)      Home Medications:  ascorbic acid 500 mg oral tablet: 1 tab(s) orally once a day (28 May 2024 00:35)  aspirin 81 mg oral capsule: 1 cap(s) orally once a day (28 May 2024 00:35)  cholecalciferol 50 mcg (2000 intl units) oral tablet: 1 tab(s) orally once a day (28 May 2024 00:35)  docusate sodium 100 mg oral capsule: 2 cap(s) orally once a day (28 May 2024 00:35)  ferrous sulfate 325 mg (65 mg elemental iron) oral tablet: 1 tab(s) orally once a day (28 May 2024 00:35)  hydrALAZINE 25 mg oral tablet: 1 tab(s) orally once a day (28 May 2024 00:36)  hydroCHLOROthiazide 25 mg oral tablet: 1 tab(s) orally once a day (28 May 2024 00:36)  isoniazid 100 mg oral tablet: 0.5 tab(s) orally 2 times a day (28 May 2024 00:36)  isosorbide dinitrate 10 mg oral tablet: 1 tab(s) orally every 8 hours (28 May 2024 00:36)  Lovenox 80 mg/0.8 mL injectable solution: 80 milligram(s) subcutaneously 2 times a day (28 May 2024 00:36)  mirtazapine 15 mg oral tablet: 1 tab(s) orally once a day (at bedtime) (28 May 2024 00:36)  Multiple Vitamins oral tablet: 1 tab(s) orally once a day (28 May 2024 00:36)  pravastatin 80 mg oral tablet: 1 tab(s) orally once a day (at bedtime) (28 May 2024 00:36)        MEDICATIONS  STANDING MEDICATIONS  acetaminophen     Tablet .. 1000 milliGRAM(s) Oral every 8 hours  ascorbic acid 500 milliGRAM(s) Oral daily  atorvastatin 80 milliGRAM(s) Oral at bedtime  cholecalciferol 2000 Unit(s) Oral daily  ferrous    sulfate 325 milliGRAM(s) Oral daily  hydrALAZINE 25 milliGRAM(s) Oral daily  hydrochlorothiazide 25 milliGRAM(s) Oral daily  isosorbide   dinitrate Tablet (ISORDIL) 10 milliGRAM(s) Oral three times a day  mirtazapine 15 milliGRAM(s) Oral at bedtime  multivitamin 1 Tablet(s) Oral daily  senna 2 Tablet(s) Oral at bedtime  sodium zirconium cyclosilicate 10 Gram(s) Oral once    PRN MEDICATIONS  oxyCODONE    IR 2.5 milliGRAM(s) Oral every 4 hours PRN    VITALS:  T(F): 99.2  HR: 89  BP: 107/75  RR: 18  SpO2: 98%    <<<<<PHYSICAL EXAM>>>>>  GENERAL: NAD  PULMONARY: Clear to auscultation bilaterally. No wheezing or crackles  CARDIOVASCULAR: Regular rate and rhythm, S1-S2, no murmurs, rubs, or gallops  GASTROINTESTINAL: Hard, diffusely distended, diffuse mild TTP on palpation, no guarding  SKIN/EXTREMITIES: Warm, 2+ tibialis posterior pulses, no UE or LE edema  NEUROLOGIC/MUSCULOSKELETAL: AOx4, grossly moving all extremities, no focal deficits    <<<<<LABS>>>>>                        5.1    6.52  )-----------( 226      ( 28 May 2024 16:55 )             16.2     05-28    133<L>  |  103  |  44<H>  ----------------------------<  295<H>  5.6<H>   |  20  |  1.6<H>    Ca    7.9<L>      28 May 2024 16:55  Mg     1.8     05-28    TPro  6.1  /  Alb  3.5  /  TBili  0.2  /  DBili  x   /  AST  10  /  ALT  <5  /  AlkPhos  49  05-28    PT/INR - ( 27 May 2024 19:15 )   PT: 14.10 sec;   INR: 1.23 ratio         PTT - ( 27 May 2024 19:15 )  PTT:44.0 sec  Urinalysis Basic - ( 28 May 2024 16:55 )    Color: x / Appearance: x / SG: x / pH: x  Gluc: 295 mg/dL / Ketone: x  / Bili: x / Urobili: x   Blood: x / Protein: x / Nitrite: x   Leuk Esterase: x / RBC: x / WBC x   Sq Epi: x / Non Sq Epi: x / Bacteria: x            Urinalysis with Rflx Culture (collected 27 May 2024 20:02)    Culture - Blood (collected 27 May 2024 18:20)  Source: .Blood Blood-Peripheral  Preliminary Report (29 May 2024 04:02):    No growth at 24 hours    Culture - Blood (collected 27 May 2024 18:20)  Source: .Blood Blood-Peripheral  Preliminary Report (29 May 2024 04:02):    No growth at 24 hours    [id number]        <<<<<RADIOLOGY>>>>>  CT AP IC (5/27): Patent anterior abdominal wall 16.3 x 9.0 x 9.0 cm collection with layering hyperdensities and surrounding soft tissue stranding. Findings may reflect hematoma in appropriate clinical setting. Additional anterior abdominal wall subcutaneous soft tissue nodular densities, possibly reflecting injection granulomas/hematomas. Left lower lobe 0.5 cm pulmonary nodule.    BL LE venous duplex (5/28): No evidence of deep venous thrombosis in either lower extremity.    -----------------------------------------------------------------------------------------------------------------------------------------------------------------------------------------------

## 2024-05-29 NOTE — PROGRESS NOTE ADULT - ASSESSMENT
Patient is a 73 yo F from  w/[facility] of HTN, DM, CKD, schizophrenia, DVT on Lovenox, and surgical history of oophorectomy who was brought in by EMS for burning right lower abdominal pain that started the day before presentation with enlarging mass at the same site, now admitted to medicine for work up and management of abdominal wall hematoma.    # Abdominal wall mass   # Probably hematoma, less likely infected   - Complaining from abdominal pain   - CT Abdomen and Pelvis w/ IV Cont IMPRESSION: Patent anterior abdominal wall 16.3 x 9.0 x 9.0 cm collection with layering hyperdensities and surrounding soft tissue stranding. Findings may reflect hematoma in appropriate clinical setting. Additional anterior abdominal wall subcutaneous soft tissue nodular densities, possibly reflecting injection granulomas/hematomas. Left lower lobe 0.5 cm pulmonary nodule.  - Surgery on board, impression is hematoma: no surgical intervention needed, HOLD AC, restart when hgb is stable  - Abdominal binder  - Hold AC and aspirin (AC for DVT, aspirin for atherosclerotic heart disease per the chart but no mention for stents)   - Pain control with Tylenol and PRN oxycodone  - BCx x 2 (5/27) NGTD  - UA negative  - Hgb 8.6 (5/27) -> 6.7 (5/28) -> 5.1 (5/28), now s/p 2 u pRBC, f/u post transfusion hgb  - WBC 6.52 (5/28)  - Trend hgb, transfuse as needed  - IR consult recs (5/28): No acute IR intervention because no active bleed demonstrated. Hold lovenox. Monitor H&H and transfuse PRN. Recommend bilateral lower extremity duplex to determine whether pt a candidate for IVC filter.  - BL LE venous duplex (5/28): No evidence of deep venous thrombosis in either lower extremity  - F/u w/IR team    # CKD stage 4  # Hyperkalemia   # HTN   # HLD  - Cr 1.7 -> 1.6  - K 6.2 -> 5.0 -> 5.7 (5/28)  - S/P two doses of Lokelma   - s/p insulin/dextrose (5/28)  - Continue home hydralazine, HCTZ, pravastatin, isosorbide dinitrate   - Monitor K and Cr    # Constipation  - start senna     ** The patient is taking isoniazid 50X2, no available reason in the charts for this, mentioned as prophylaxis?, will hold for now as the patient is complaining from some numbness  ** Get [facility] records to check DVT date, isoniazid indication    #MISC  -  GI prophylaxis: none  -  DVT prophylaxis: none (possible abdominal wall hematoma)   -  Full code  -  Diet: NPO after midnight for possible procedure     Pending: Restart diet?   Patient is a 73 yo F from  w/[facility] of HTN, DM, CKD, schizophrenia, DVT on Lovenox, and surgical history of oophorectomy who was brought in by EMS for burning right lower abdominal pain that started the day before presentation with enlarging mass at the same site, now admitted to medicine for work up and management of abdominal wall hematoma.    # Abdominal wall mass   # Probably hematoma, less likely infected   - Complaining from abdominal pain   - CT Abdomen and Pelvis w/ IV Cont IMPRESSION: Patent anterior abdominal wall 16.3 x 9.0 x 9.0 cm collection with layering hyperdensities and surrounding soft tissue stranding. Findings may reflect hematoma in appropriate clinical setting. Additional anterior abdominal wall subcutaneous soft tissue nodular densities, possibly reflecting injection granulomas/hematomas. Left lower lobe 0.5 cm pulmonary nodule.  - Surgery on board, impression is hematoma: no surgical intervention needed, HOLD AC, restart when hgb is stable  - Abdominal binder  - Hold AC and aspirin (AC for DVT, aspirin for atherosclerotic heart disease per the chart but no mention for stents)   - Pain control with Tylenol and PRN oxycodone  - BCx x 2 (5/27) NGTD  - UA negative  - Hgb 8.6 (5/27) -> 6.7 (5/28) -> 5.1 (5/28) -> s/p 2 u pRBC -> 8.6 (5/29)  - WBC 6.52 (5/28)  - Trend hgb, transfuse as needed  - IR consult recs (5/28): No acute IR intervention because no active bleed demonstrated. Hold lovenox. Monitor H&H and transfuse PRN. Recommend bilateral lower extremity duplex to determine whether pt a candidate for IVC filter.  - BL LE venous duplex (5/28): No evidence of deep venous thrombosis in either lower extremity  - F/u w/IR team    # CKD stage 4  # Hyperkalemia   # HTN   # HLD  - Cr 1.7 -> 1.6  - K 6.2 -> 5.0 -> 5.7 (5/28)  - S/P two doses of Lokelma   - s/p insulin/dextrose (5/28)  - Continue home hydralazine, HCTZ, pravastatin, isosorbide dinitrate   - Monitor K and Cr    # Constipation  - start senna     ** The patient is taking isoniazid 50X2, no available reason in the charts for this, mentioned as prophylaxis?, will hold for now as the patient is complaining from some numbness  ** Get [facility] records to check DVT date, isoniazid indication    #MISC  -  GI prophylaxis: none  -  DVT prophylaxis: none (possible abdominal wall hematoma)   -  Full code  -  Diet: NPO after midnight for possible procedure     Pending: Restart diet?   Patient is a 73 yo F from  w/[facility] of HTN, DM, CKD, schizophrenia, DVT on Lovenox, and surgical history of oophorectomy who was brought in by EMS for burning right lower abdominal pain that started the day before presentation with enlarging mass at the same site, now admitted to medicine for work up and management of abdominal wall hematoma.    # Abdominal wall mass   # Probably hematoma, less likely infected   - Complaining from abdominal pain   - CT Abdomen and Pelvis w/ IV Cont IMPRESSION: Patent anterior abdominal wall 16.3 x 9.0 x 9.0 cm collection with layering hyperdensities and surrounding soft tissue stranding. Findings may reflect hematoma in appropriate clinical setting. Additional anterior abdominal wall subcutaneous soft tissue nodular densities, possibly reflecting injection granulomas/hematomas. Left lower lobe 0.5 cm pulmonary nodule.  - Surgery on board, impression is hematoma: no surgical intervention needed, HOLD AC, restart when hgb is stable  - Abdominal binder  - Hold AC and aspirin (AC for DVT, aspirin for atherosclerotic heart disease per the chart but no mention for stents)   - Pain control with Tylenol and PRN oxycodone  - BCx x 2 (5/27) NGTD  - UA negative  - Hgb 8.6 (5/27) -> 6.7 (5/28) -> 5.1 (5/28) -> s/p 2 u pRBC -> 8.6 (5/29)  - WBC 6.52 (5/28)  - Trend hgb, transfuse as needed  - IR consult recs (5/28): No acute IR intervention because no active bleed demonstrated. Hold lovenox. Monitor H&H and transfuse PRN. Recommend bilateral lower extremity duplex to determine whether pt a candidate for IVC filter.  - BL LE venous duplex (5/28): No evidence of deep venous thrombosis in either lower extremity  - F/u w/IR team    # CKD stage 4  # Hyperkalemia   # HTN   # HLD  - Cr 1.7 -> 1.6  - K 6.2 -> 5.0 -> 5.7 (5/28)  - S/P two doses of Lokelma   - s/p insulin/dextrose (5/28)  - Continue home hydralazine, HCTZ, pravastatin, isosorbide dinitrate   - Monitor K and Cr    # Constipation  - start senna     ** The patient is taking isoniazid 50X2, no available reason in the charts for this, mentioned as prophylaxis?, will hold for now as the patient is complaining from some numbness  ** Get [facility] records to check DVT date, isoniazid indication    #MISC  -  GI prophylaxis: none  -  DVT prophylaxis: SCDs  -  Full code  -  Diet: Low K, DASH, CC, Renal diet

## 2024-05-30 LAB
ANION GAP SERPL CALC-SCNC: 11 MMOL/L — SIGNIFICANT CHANGE UP (ref 7–14)
ANION GAP SERPL CALC-SCNC: 21 MMOL/L — HIGH (ref 7–14)
BUN SERPL-MCNC: 39 MG/DL — HIGH (ref 10–20)
BUN SERPL-MCNC: 40 MG/DL — HIGH (ref 10–20)
CALCIUM SERPL-MCNC: 8.1 MG/DL — LOW (ref 8.4–10.5)
CALCIUM SERPL-MCNC: 8.7 MG/DL — SIGNIFICANT CHANGE UP (ref 8.4–10.5)
CHLORIDE SERPL-SCNC: 102 MMOL/L — SIGNIFICANT CHANGE UP (ref 98–110)
CHLORIDE SERPL-SCNC: 105 MMOL/L — SIGNIFICANT CHANGE UP (ref 98–110)
CO2 SERPL-SCNC: 15 MMOL/L — LOW (ref 17–32)
CO2 SERPL-SCNC: 22 MMOL/L — SIGNIFICANT CHANGE UP (ref 17–32)
CREAT SERPL-MCNC: 1.7 MG/DL — HIGH (ref 0.7–1.5)
CREAT SERPL-MCNC: 1.7 MG/DL — HIGH (ref 0.7–1.5)
EGFR: 31 ML/MIN/1.73M2 — LOW
EGFR: 31 ML/MIN/1.73M2 — LOW
GLUCOSE BLDC GLUCOMTR-MCNC: 133 MG/DL — HIGH (ref 70–99)
GLUCOSE BLDC GLUCOMTR-MCNC: 140 MG/DL — HIGH (ref 70–99)
GLUCOSE BLDC GLUCOMTR-MCNC: 146 MG/DL — HIGH (ref 70–99)
GLUCOSE BLDC GLUCOMTR-MCNC: 155 MG/DL — HIGH (ref 70–99)
GLUCOSE SERPL-MCNC: 123 MG/DL — HIGH (ref 70–99)
GLUCOSE SERPL-MCNC: 144 MG/DL — HIGH (ref 70–99)
HCT VFR BLD CALC: 27 % — LOW (ref 37–47)
HGB BLD-MCNC: 8.6 G/DL — LOW (ref 12–16)
LACTATE SERPL-SCNC: 1.6 MMOL/L — SIGNIFICANT CHANGE UP (ref 0.7–2)
MCHC RBC-ENTMCNC: 28.3 PG — SIGNIFICANT CHANGE UP (ref 27–31)
MCHC RBC-ENTMCNC: 31.9 G/DL — LOW (ref 32–37)
MCV RBC AUTO: 88.8 FL — SIGNIFICANT CHANGE UP (ref 81–99)
NRBC # BLD: 0 /100 WBCS — SIGNIFICANT CHANGE UP (ref 0–0)
NT-PROBNP SERPL-SCNC: 187 PG/ML — SIGNIFICANT CHANGE UP (ref 0–300)
PLATELET # BLD AUTO: 286 K/UL — SIGNIFICANT CHANGE UP (ref 130–400)
PMV BLD: 10.5 FL — HIGH (ref 7.4–10.4)
POTASSIUM SERPL-MCNC: 3.9 MMOL/L — SIGNIFICANT CHANGE UP (ref 3.5–5)
POTASSIUM SERPL-MCNC: 4.1 MMOL/L — SIGNIFICANT CHANGE UP (ref 3.5–5)
POTASSIUM SERPL-SCNC: 3.9 MMOL/L — SIGNIFICANT CHANGE UP (ref 3.5–5)
POTASSIUM SERPL-SCNC: 4.1 MMOL/L — SIGNIFICANT CHANGE UP (ref 3.5–5)
RBC # BLD: 3.04 M/UL — LOW (ref 4.2–5.4)
RBC # FLD: 15.3 % — HIGH (ref 11.5–14.5)
SODIUM SERPL-SCNC: 138 MMOL/L — SIGNIFICANT CHANGE UP (ref 135–146)
SODIUM SERPL-SCNC: 138 MMOL/L — SIGNIFICANT CHANGE UP (ref 135–146)
WBC # BLD: 14.21 K/UL — HIGH (ref 4.8–10.8)
WBC # FLD AUTO: 14.21 K/UL — HIGH (ref 4.8–10.8)

## 2024-05-30 PROCEDURE — 99232 SBSQ HOSP IP/OBS MODERATE 35: CPT

## 2024-05-30 PROCEDURE — 76770 US EXAM ABDO BACK WALL COMP: CPT | Mod: 26

## 2024-05-30 PROCEDURE — 71045 X-RAY EXAM CHEST 1 VIEW: CPT | Mod: 26

## 2024-05-30 RX ORDER — SODIUM BICARBONATE 1 MEQ/ML
1300 SYRINGE (ML) INTRAVENOUS THREE TIMES A DAY
Refills: 0 | Status: COMPLETED | OUTPATIENT
Start: 2024-05-30 | End: 2024-05-31

## 2024-05-30 RX ORDER — SODIUM CHLORIDE 9 MG/ML
1000 INJECTION, SOLUTION INTRAVENOUS
Refills: 0 | Status: DISCONTINUED | OUTPATIENT
Start: 2024-05-30 | End: 2024-06-02

## 2024-05-30 RX ORDER — POLYETHYLENE GLYCOL 3350 17 G/17G
17 POWDER, FOR SOLUTION ORAL
Refills: 0 | Status: DISCONTINUED | OUTPATIENT
Start: 2024-05-30 | End: 2024-06-02

## 2024-05-30 RX ADMIN — Medication 500 MILLIGRAM(S): at 11:17

## 2024-05-30 RX ADMIN — MIRTAZAPINE 15 MILLIGRAM(S): 45 TABLET, ORALLY DISINTEGRATING ORAL at 22:14

## 2024-05-30 RX ADMIN — ATORVASTATIN CALCIUM 80 MILLIGRAM(S): 80 TABLET, FILM COATED ORAL at 22:14

## 2024-05-30 RX ADMIN — ISOSORBIDE DINITRATE 10 MILLIGRAM(S): 5 TABLET ORAL at 05:14

## 2024-05-30 RX ADMIN — Medication 1 TABLET(S): at 11:16

## 2024-05-30 RX ADMIN — PANTOPRAZOLE SODIUM 40 MILLIGRAM(S): 20 TABLET, DELAYED RELEASE ORAL at 05:14

## 2024-05-30 RX ADMIN — ISOSORBIDE DINITRATE 10 MILLIGRAM(S): 5 TABLET ORAL at 15:31

## 2024-05-30 RX ADMIN — Medication 1300 MILLIGRAM(S): at 22:14

## 2024-05-30 RX ADMIN — Medication 1300 MILLIGRAM(S): at 14:04

## 2024-05-30 RX ADMIN — SODIUM CHLORIDE 100 MILLILITER(S): 9 INJECTION, SOLUTION INTRAVENOUS at 14:05

## 2024-05-30 RX ADMIN — Medication 1000 MILLIGRAM(S): at 06:39

## 2024-05-30 RX ADMIN — POLYETHYLENE GLYCOL 3350 17 GRAM(S): 17 POWDER, FOR SOLUTION ORAL at 17:19

## 2024-05-30 RX ADMIN — Medication 1000 MILLIGRAM(S): at 22:44

## 2024-05-30 RX ADMIN — Medication 1000 MILLIGRAM(S): at 14:30

## 2024-05-30 RX ADMIN — Medication 1000 MILLIGRAM(S): at 05:14

## 2024-05-30 RX ADMIN — Medication 1000 MILLIGRAM(S): at 14:04

## 2024-05-30 RX ADMIN — ISOSORBIDE DINITRATE 10 MILLIGRAM(S): 5 TABLET ORAL at 11:16

## 2024-05-30 RX ADMIN — Medication 2000 UNIT(S): at 11:31

## 2024-05-30 RX ADMIN — SENNA PLUS 2 TABLET(S): 8.6 TABLET ORAL at 22:14

## 2024-05-30 RX ADMIN — Medication 1000 MILLIGRAM(S): at 22:14

## 2024-05-30 NOTE — PROGRESS NOTE ADULT - ASSESSMENT
Patient is a 75 yo F from  w/[facility] of HTN, DM, CKD, schizophrenia, DVT on Lovenox, and surgical history of oophorectomy who was brought in by EMS for burning right lower abdominal pain that started the day before presentation with enlarging mass at the same site, now admitted to medicine for work up and management of abdominal wall hematoma.    # Abdominal wall mass   # Probably hematoma, less likely infected   - Complaining from abdominal pain   - CT Abdomen and Pelvis w/ IV Cont IMPRESSION: Patent anterior abdominal wall 16.3 x 9.0 x 9.0 cm collection with layering hyperdensities and surrounding soft tissue stranding. Findings may reflect hematoma in appropriate clinical setting. Additional anterior abdominal wall subcutaneous soft tissue nodular densities, possibly reflecting injection granulomas/hematomas. Left lower lobe 0.5 cm pulmonary nodule.  - Surgery on board, impression is hematoma: no surgical intervention needed, HOLD AC, restart when hgb is stable  - Abdominal binder  - Hold AC and aspirin (AC for DVT, aspirin for atherosclerotic heart disease per the chart but no mention for stents)   - Pain control with Tylenol and PRN oxycodone  - BCx x 2 (5/27) NGTD  - UA negative  - Hgb 8.6 (5/27) -> 6.7 (5/28) -> 5.1 (5/28) -> s/p 2 u pRBC -> 8.6 (5/29)  - WBC 6.52 (5/28)  - Trend hgb, transfuse as needed  - IR consult recs (5/28): No acute IR intervention because no active bleed demonstrated. Hold lovenox. Monitor H&H and transfuse PRN. Recommend bilateral lower extremity duplex to determine whether pt a candidate for IVC filter.  - BL LE venous duplex (5/28): No evidence of deep venous thrombosis in either lower extremity  - Check [facility] records to determine if AC should be restarted or not    # TRACY on CKD (resolving)  # Hyperkalemia (resolved)  # HTN   # HLD  - Cr 1.7 -> 1.6 -> 1.3 (5/29)  - K 6.2 -> 5.0 -> 5.7 -> 4.7 (5/29)  - S/P two doses of Lokelma   - s/p insulin/dextrose (5/28)  - Continue home hydralazine, HCTZ, pravastatin, isosorbide dinitrate   - Monitor K and Cr    # Constipation  - c/w senna and miralax    ** The patient is taking isoniazid 50X2, no available reason in the charts for this, mentioned as prophylaxis?, will hold for now as the patient is complaining from some numbness  ** Get [facility] records to check DVT date, isoniazid indication    #MISC  -  GI prophylaxis: none  -  DVT prophylaxis: SCDs  -  Full code  -  Diet: Low K, DASH, CC, Renal diet   Patient is a 75 yo F from  w/[facility] of HTN, DM, CKD, schizophrenia, DVT on Lovenox, and surgical history of oophorectomy who was brought in by EMS for burning right lower abdominal pain that started the day before presentation with enlarging mass at the same site, now admitted to medicine for work up and management of abdominal wall hematoma.    # Abdominal wall mass   # Probably hematoma, less likely infected   - Complaining from abdominal pain   - CT Abdomen and Pelvis w/ IV Cont IMPRESSION: Patent anterior abdominal wall 16.3 x 9.0 x 9.0 cm collection with layering hyperdensities and surrounding soft tissue stranding. Findings may reflect hematoma in appropriate clinical setting. Additional anterior abdominal wall subcutaneous soft tissue nodular densities, possibly reflecting injection granulomas/hematomas. Left lower lobe 0.5 cm pulmonary nodule.  - Surgery on board, impression is hematoma: no surgical intervention needed, HOLD AC, restart when hgb is stable  - Abdominal binder  - Hold AC and aspirin (AC for DVT, aspirin for atherosclerotic heart disease per the chart but no mention for stents)   - Pain control with Tylenol and PRN oxycodone  - BCx x 2 (5/27) NGTD  - UA negative  - Hgb 8.6 (5/27) -> 6.7 (5/28) -> 5.1 (5/28) -> s/p 2 u pRBC -> 8.6 (5/29)  - WBC 6.52 (5/28)  - Trend hgb, transfuse as needed  - IR consult recs (5/28): No acute IR intervention because no active bleed demonstrated. Hold lovenox. Monitor H&H and transfuse PRN. Recommend bilateral lower extremity duplex to determine whether pt a candidate for IVC filter.  - BL LE venous duplex (5/28): No evidence of deep venous thrombosis in either lower extremity  - Check [facility] records to determine if AC should be restarted or not    # TRACY on CKD (resolving)  # Hyperkalemia (resolved)  # HTN   # HLD  - Cr 1.7 -> 1.6 -> 1.3 (5/29)  - K 6.2 -> 5.0 -> 5.7 -> 4.7 (5/29)  - S/P two doses of Lokelma   - s/p insulin/dextrose (5/28)  - Continue home hydralazine, HCTZ, pravastatin, isosorbide dinitrate   - Monitor K and Cr    # Constipation  - c/w senna and miralax    ** The patient is taking isoniazid 50X2, no available reason in the charts for this, mentioned as prophylaxis?, will hold for now as the patient is complaining from some numbness  ** Get [facility] records to check DVT date, isoniazid indication    #MISC  -  GI prophylaxis: none  -  DVT prophylaxis: SCDs  -  Full code  -  Diet: Low K, DASH, CC, Renal diet    Pending: [facility] records, f/u AM hgb, discharge within 24 hours Patient is a 75 yo F from  w/[facility] of HTN, DM, CKD, schizophrenia, DVT on Lovenox, and surgical history of oophorectomy who was brought in by EMS for burning right lower abdominal pain that started the day before presentation with enlarging mass at the same site, now admitted to medicine for work up and management of abdominal wall hematoma.    # Abdominal wall mass   # Probably hematoma, less likely infected   - Complaining from abdominal pain   - CT Abdomen and Pelvis w/ IV Cont IMPRESSION: Patent anterior abdominal wall 16.3 x 9.0 x 9.0 cm collection with layering hyperdensities and surrounding soft tissue stranding. Findings may reflect hematoma in appropriate clinical setting. Additional anterior abdominal wall subcutaneous soft tissue nodular densities, possibly reflecting injection granulomas/hematomas. Left lower lobe 0.5 cm pulmonary nodule.  - Surgery on board, impression is hematoma: no surgical intervention needed, HOLD AC, restart when hgb is stable  - Abdominal binder  - Hold AC and aspirin (AC for DVT, aspirin for atherosclerotic heart disease per the chart but no mention for stents)   - Pain control with Tylenol and PRN oxycodone  - BCx x 2 (5/27) NGTD  - UA negative  - Hgb 8.6 (5/27) -> 6.7 (5/28) -> 5.1 (5/28) -> s/p 2 u pRBC -> 8.6 (5/29)  - WBC 6.52 (5/28)  - Trend hgb, transfuse as needed  - IR consult recs (5/28): No acute IR intervention because no active bleed demonstrated. Hold lovenox. Monitor H&H and transfuse PRN. Recommend bilateral lower extremity duplex to determine whether pt a candidate for IVC filter.  - BL LE venous duplex (5/28): No evidence of deep venous thrombosis in either lower extremity  - Check [facility] records to determine if AC should be restarted or not    # TRACY on CKD, TRACY likely prerenal, possibly ATN from blood loss and hypotension, possibly 2/2 obstruction due to hematoma  # Hyperkalemia (resolved)  # HTN   # HLD  - Cr 1.7 -> 1.6 -> 1.3 -> 1.7 (5/30)  - BUN 44 -> 39 (5/30)  - K 6.2 -> 5.0 -> 5.7 -> 4.7 -> 3.9 (5/30)  - S/P two doses of Lokelma   - s/p insulin/dextrose (5/28)  - Stop hydralazine and HCTZ  - Continue pravastatin and isosorbide dinitrate   - Start  cc/hr for 10 hrs  - Monitor K and Cr  - Pending BNP and BMP at 4 pm  - Pending RBUS    #HAGMA  - Bicarb 19 (5/29) -> 15 (5/30)  - AG 14 (5/29) -> 21 (5/30)  - Pending lactate at 4 pm  - Start bicarb 650 mg tid    # Constipation  - c/w senna and miralax    #MISC  -  GI prophylaxis: none  -  DVT prophylaxis: SCDs  -  Full code  -  Diet: Low K, DASH, CC, Renal diet    Pending: [facility] records, 4 pm BMP, BNP, and lactate Patient is a 73 yo F from  w/[facility] of HTN, DM, CKD, schizophrenia, DVT on Lovenox, and surgical history of oophorectomy who was brought in by EMS for burning right lower abdominal pain that started the day before presentation with enlarging mass at the same site, now admitted to medicine for work up and management of abdominal wall hematoma.    # Abdominal wall mass   # Probably hematoma, less likely infected   - Complaining from abdominal pain   - CT Abdomen and Pelvis w/ IV Cont IMPRESSION: Patent anterior abdominal wall 16.3 x 9.0 x 9.0 cm collection with layering hyperdensities and surrounding soft tissue stranding. Findings may reflect hematoma in appropriate clinical setting. Additional anterior abdominal wall subcutaneous soft tissue nodular densities, possibly reflecting injection granulomas/hematomas. Left lower lobe 0.5 cm pulmonary nodule.  - Surgery on board, impression is hematoma: no surgical intervention needed, HOLD AC, restart when hgb is stable  - Abdominal binder  - Hold AC and aspirin (AC for DVT, aspirin for atherosclerotic heart disease per the chart but no mention for stents)   - Pain control with Tylenol and PRN oxycodone  - BCx x 2 (5/27) NGTD  - UA negative  - Hgb 8.6 (5/27) -> 6.7 (5/28) -> 5.1 (5/28) -> s/p 2 u pRBC -> 8.6 (5/29)  - WBC 6.52 (5/28)  - Trend hgb, transfuse as needed  - IR consult recs (5/28): No acute IR intervention because no active bleed demonstrated. Hold lovenox. Monitor H&H and transfuse PRN. Recommend bilateral lower extremity duplex to determine whether pt a candidate for IVC filter.  - BL LE venous duplex (5/28): No evidence of deep venous thrombosis in either lower extremity  - Check [facility] records to determine if AC should be restarted or not    # TRACY on CKD, TRACY likely prerenal, possibly ATN from blood loss and hypotension, possibly 2/2 obstruction due to hematoma  # Hyperkalemia (resolved)  # HTN   # HLD  - Cr 1.7 -> 1.6 -> 1.3 -> 1.7 (5/30)  - BUN 44 -> 39 (5/30)  - K 6.2 -> 5.0 -> 5.7 -> 4.7 -> 3.9 (5/30)  - S/P two doses of Lokelma   - s/p insulin/dextrose (5/28)  - Stop hydralazine and HCTZ  - Continue pravastatin and isosorbide dinitrate   - Start  cc/hr for 10 hrs  - Monitor K and Cr  - Pending BNP and BMP at 4 pm  - Pending RBUS    #HAGMA  - Bicarb 19 (5/29) -> 15 (5/30)  - AG 14 (5/29) -> 21 (5/30)  - Pending lactate at 4 pm  - Start bicarb 1300 mg tid for 3 doses    # Constipation  - c/w senna and miralax    #MISC  -  GI prophylaxis: none  -  DVT prophylaxis: SCDs  -  Full code  -  Diet: Low K, DASH, CC, Renal diet    Pending: [facility] records, 4 pm BMP, BNP, and lactate

## 2024-05-30 NOTE — PROGRESS NOTE ADULT - SUBJECTIVE AND OBJECTIVE BOX
VANGIE SEBASTIAN  74y  [id number]      Patient is a 74y old  Female who presents with a chief complaint of abdominal wall hematoma (29 May 2024 05:12)      INTERVAL HPI/OVERNIGHT EVENTS:  Patient feels well  Still with abdominal pain.   no new complains  no overnight events  stated that she is been getting lovenox injection for a yr?         FAMILY HISTORY:    T(C): 37.3 (05-29-24 @ 04:54), Max: 37.6 (05-28-24 @ 21:24)  HR: 89 (05-29-24 @ 04:54) (86 - 103)  BP: 107/75 (05-29-24 @ 04:54) (92/64 - 126/76)  RR: 18 (05-29-24 @ 04:54) (18 - 18)  SpO2: 98% (05-29-24 @ 04:54) (95% - 99%)  Wt(kg): --Vital Signs Last 24 Hrs  T(C): 37.3 (29 May 2024 04:54), Max: 37.6 (28 May 2024 21:24)  T(F): 99.2 (29 May 2024 04:54), Max: 99.6 (28 May 2024 21:24)  HR: 89 (29 May 2024 04:54) (86 - 103)  BP: 107/75 (29 May 2024 04:54) (92/64 - 126/76)  BP(mean): 89 (29 May 2024 04:54) (73 - 97)  RR: 18 (29 May 2024 04:54) (18 - 18)  SpO2: 98% (29 May 2024 04:54) (95% - 99%)    Parameters below as of 29 May 2024 04:54  Patient On (Oxygen Delivery Method): room air        PHYSICAL EXAM:  GENERAL: NAD, well-groomed, well-developed  PULM: Clear to auscultation bilateral  GI: Soft, Nontender, distended; Bowel sounds present  EXTREMITIES:  2+ Peripheral Pulse    Consultant(s) Notes Reviewed:  [x ] YES  [ ] NO  Care Discussed with Consultants/Other Providers [ x] YES  [ ] NO    LABS:                            5.1    6.52  )-----------( 226      ( 28 May 2024 16:55 )             16.2   05-28    133<L>  |  103  |  44<H>  ----------------------------<  295<H>  5.6<H>   |  20  |  1.6<H>    Ca    7.9<L>      28 May 2024 16:55  Mg     1.8     05-28    TPro  6.1  /  Alb  3.5  /  TBili  0.2  /  DBili  x   /  AST  10  /  ALT  <5  /  AlkPhos  49  05-28            Urinalysis with Rflx Culture (collected 27 May 2024 20:02)    Culture - Blood (collected 27 May 2024 18:20)  Source: .Blood Blood-Peripheral  Preliminary Report (29 May 2024 04:02):    No growth at 24 hours    Culture - Blood (collected 27 May 2024 18:20)  Source: .Blood Blood-Peripheral  Preliminary Report (29 May 2024 04:02):    No growth at 24 hours      acetaminophen     Tablet .. 1000 milliGRAM(s) Oral every 8 hours  ascorbic acid 500 milliGRAM(s) Oral daily  atorvastatin 80 milliGRAM(s) Oral at bedtime  cholecalciferol 2000 Unit(s) Oral daily  ferrous    sulfate 325 milliGRAM(s) Oral daily  hydrALAZINE 25 milliGRAM(s) Oral daily  hydrochlorothiazide 25 milliGRAM(s) Oral daily  isosorbide   dinitrate Tablet (ISORDIL) 10 milliGRAM(s) Oral three times a day  mirtazapine 15 milliGRAM(s) Oral at bedtime  multivitamin 1 Tablet(s) Oral daily  oxyCODONE    IR 2.5 milliGRAM(s) Oral every 4 hours PRN  senna 2 Tablet(s) Oral at bedtime        This is a 73 yo F from  w/[facility] of HTN, DM, CKD, schizophrenia, DVT on Lovenox, and surgical history of oophorectomy who was brought in by EMS for burning right lower abdominal pain that started the day before presentation with enlarging mass at the same site, now admitted to medicine for work up and management of abdominal wall hematoma.    1. Abdominal pain due to Abdominal wall mass due to hematoma, less likely infected complicated by acute blood loss anemia s/p 2 PRBC   - Abdominal binder          - UA negative  - Hold AC and aspirin (AC for DVT, aspirin for atherosclerotic heart disease per the chart)  * pt stated that she was getting lovenox for a yr. now. Getting records   - Pain control with Tylenol and PRN oxycodone  - BCx x 2 (5/27) NGTD  - BL LE venous duplex (5/28): No evidence of deep venous thrombosis in either lower extremity  - CT Abdomen and Pelvis w/ IV Cont IMPRESSION:   a) Patent anterior abdominal wall 16.3 x 9.0 x 9.0 cm collection with layering hyperdensities and surrounding soft tissue stranding. Findings may reflect hematoma in appropriate clinical setting. Additional anterior abdominal wall subcutaneous soft tissue nodular densities, possibly reflecting injection granulomas/hematomas. Left lower lobe 0.5 cm pulmonary nodule.  - Surgery on board, impression is hematoma:   a) no surgical intervention needed, HOLD AC, restart when hgb is stable  - Hgb 8.6 (5/27) -> 6.7 (5/28) -> 5.1 (5/28), now s/p 2 u pRBC, f/u post transfusion hgb  - IR consult recs (5/28): No acute IR intervention because no active bleed demonstrated. Hold lovenox. Monitor H&H and transfuse PRN. Recommend bilateral lower extremity duplex to determine whether pt a candidate for IVC filter.      2. Acute kidney injury on top of ckd stage 3b resolving complicated by hyperkalemia  - S/P two doses of Lokelma   - s/p insulin/dextrose (5/28)  - Was on IVF     3. HTN / HLD  - Stop hydralazine it was ordered daily   - Stop HCTZ (bp seems overcontrolled)   - Cont  pravastatin,   - Cont isosorbide dinitrate     4. Constipation  - Cont laxative     ** The patient is taking isoniazid 50X2, no available reason in the charts for this, mentioned as prophylaxis?, will hold for now as the patient is complaining from some numbness  ** Get [facility] records to check DVT date, isoniazid indication    #MISC  -  GI prophylaxis: ppi   -  DVT prophylaxis: scd   -  Full code    Acute blood loss anemia due to abdominal wall hematoma in a pt on therapeutic lovenox ?. getting records to check what was the indication and why lovenox   TRACY on top of ckd. IVF today. bun/cr still elevated. less likely due to hematoma  HTN overcontrolled. stop hctz and hydralazine

## 2024-05-30 NOTE — PROGRESS NOTE ADULT - SUBJECTIVE AND OBJECTIVE BOX
----------Daily Progress Note----------    HISTORY OF PRESENT ILLNESS:  Patient is a 73 yo F from  /[facility] of HTN, DM, CKD, schizophrenia, DVT on Lovenox, and surgical history of oophorectomy who was brought in by EMS for burning right lower abdominal pain that started the day before presentation with enlarging mass at the same site without fever, chills, N/V, diarrhea, or constipation, w/ED vitals stable, labs notable for K 6.2, BUN 48, Cr 1.7, and Hgb 8.6, and CT AP notable for 16.3 cm abdominal mass, likely hematoma. Surgery was consulted in the ED, whose impression was hematoma and recommended no acute surgical intervention and IR consult for possible drainage. Patient is now admitted to medicine for work up and management of abdominal wall hematoma.     Today is hospital day 3d.     INTERVAL HOSPITAL COURSE / OVERNIGHT EVENTS:  O/N events:  None    24 hr events:  None    Patient was examined and seen at bedside. Patient denies any abdominal pain at rest, denies any nausea, vomiting, or diarrhea, endorses good appetite, denies any other symptoms    Review of Systems: Otherwise unremarkable     <<<<<PAST MEDICAL & SURGICAL HISTORY>>>>>  Diabetes mellitus    Hypertension    Type 2 myocardial infarction due to hypertension    Stage 4 chronic kidney disease    Schizophrenia    MDD (major depressive disorder)    Constipation    History of bilateral oophorectomies      ALLERGIES  haloperidol (Unknown)      Home Medications:  ascorbic acid 500 mg oral tablet: 1 tab(s) orally once a day (28 May 2024 00:35)  aspirin 81 mg oral capsule: 1 cap(s) orally once a day (28 May 2024 00:35)  cholecalciferol 50 mcg (2000 intl units) oral tablet: 1 tab(s) orally once a day (28 May 2024 00:35)  docusate sodium 100 mg oral capsule: 2 cap(s) orally once a day (28 May 2024 00:35)  ferrous sulfate 325 mg (65 mg elemental iron) oral tablet: 1 tab(s) orally once a day (28 May 2024 00:35)  hydrALAZINE 25 mg oral tablet: 1 tab(s) orally once a day (28 May 2024 00:36)  hydroCHLOROthiazide 25 mg oral tablet: 1 tab(s) orally once a day (28 May 2024 00:36)  isoniazid 100 mg oral tablet: 0.5 tab(s) orally 2 times a day (28 May 2024 00:36)  isosorbide dinitrate 10 mg oral tablet: 1 tab(s) orally every 8 hours (28 May 2024 00:36)  Lovenox 80 mg/0.8 mL injectable solution: 80 milligram(s) subcutaneously 2 times a day (28 May 2024 00:36)  mirtazapine 15 mg oral tablet: 1 tab(s) orally once a day (at bedtime) (28 May 2024 00:36)  Multiple Vitamins oral tablet: 1 tab(s) orally once a day (28 May 2024 00:36)  pravastatin 80 mg oral tablet: 1 tab(s) orally once a day (at bedtime) (28 May 2024 00:36)        MEDICATIONS  STANDING MEDICATIONS  acetaminophen     Tablet .. 1000 milliGRAM(s) Oral every 8 hours  ascorbic acid 500 milliGRAM(s) Oral daily  atorvastatin 80 milliGRAM(s) Oral at bedtime  cholecalciferol 2000 Unit(s) Oral daily  isosorbide   dinitrate Tablet (ISORDIL) 10 milliGRAM(s) Oral three times a day  mirtazapine 15 milliGRAM(s) Oral at bedtime  multivitamin 1 Tablet(s) Oral daily  pantoprazole    Tablet 40 milliGRAM(s) Oral before breakfast  senna 2 Tablet(s) Oral at bedtime  sodium chloride 0.9%. 1000 milliLiter(s) IV Continuous <Continuous>    PRN MEDICATIONS  oxyCODONE    IR 2.5 milliGRAM(s) Oral every 4 hours PRN    VITALS:  T(F): 98.4  HR: 79  BP: 100/65  RR: 18  SpO2: 97%    <<<<<PHYSICAL EXAM>>>>>  GENERAL: NAD  PULMONARY: Clear to auscultation bilaterally. No wheezing or crackles  CARDIOVASCULAR: Regular rate and rhythm, S1-S2, no murmurs, rubs, or gallops  GASTROINTESTINAL: Hard, diffusely distended, diffuse mild TTP on palpation, no guarding  SKIN/EXTREMITIES: Warm, 2+ tibialis posterior pulses, no UE or LE edema  NEUROLOGIC/MUSCULOSKELETAL: AOx4, grossly moving all extremities, no focal deficits    <<<<<LABS>>>>>                        8.5    10.07 )-----------( 224      ( 29 May 2024 17:39 )             24.8     05-29    138  |  105  |  41<H>  ----------------------------<  103<H>  4.7   |  19  |  1.3    Ca    8.5      29 May 2024 07:53  Mg     1.9     05-29    TPro  6.0  /  Alb  3.4<L>  /  TBili  0.5  /  DBili  x   /  AST  11  /  ALT  <5  /  AlkPhos  43  05-29      Urinalysis Basic - ( 29 May 2024 07:53 )    Color: x / Appearance: x / SG: x / pH: x  Gluc: 103 mg/dL / Ketone: x  / Bili: x / Urobili: x   Blood: x / Protein: x / Nitrite: x   Leuk Esterase: x / RBC: x / WBC x   Sq Epi: x / Non Sq Epi: x / Bacteria: x            Urinalysis with Rflx Culture (collected 27 May 2024 20:02)    Culture - Blood (collected 27 May 2024 18:20)  Source: .Blood Blood-Peripheral  Preliminary Report (30 May 2024 04:01):    No growth at 48 Hours    Culture - Blood (collected 27 May 2024 18:20)  Source: .Blood Blood-Peripheral  Preliminary Report (30 May 2024 04:01):    No growth at 48 Hours    [id number]        <<<<<RADIOLOGY>>>>>  CT AP IC (5/27): Patent anterior abdominal wall 16.3 x 9.0 x 9.0 cm collection with layering hyperdensities and surrounding soft tissue stranding. Findings may reflect hematoma in appropriate clinical setting. Additional anterior abdominal wall subcutaneous soft tissue nodular densities, possibly reflecting injection granulomas/hematomas. Left lower lobe 0.5 cm pulmonary nodule.    BL LE venous duplex (5/28): No evidence of deep venous thrombosis in either lower extremity.      ----------------------------------------------------------------------------------------------------------------------------------------------------------------------------------------------- ----------Daily Progress Note----------    HISTORY OF PRESENT ILLNESS:  Patient is a 73 yo F from  /[facility] of HTN, DM, CKD, schizophrenia, DVT on Lovenox, and surgical history of oophorectomy who was brought in by EMS for burning right lower abdominal pain that started the day before presentation with enlarging mass at the same site without fever, chills, N/V, diarrhea, or constipation, w/ED vitals stable, labs notable for K 6.2, BUN 48, Cr 1.7, and Hgb 8.6, and CT AP notable for 16.3 cm abdominal mass, likely hematoma. Surgery was consulted in the ED, whose impression was hematoma and recommended no acute surgical intervention and IR consult for possible drainage. Patient is now admitted to medicine for work up and management of abdominal wall hematoma.     Today is hospital day 3d.     INTERVAL HOSPITAL COURSE / OVERNIGHT EVENTS:  O/N events:  None    24 hr events:  None    Patient was examined and seen at bedside. Patient reports improvement in abdominal pain, denies any nausea, vomiting, or diarrhea, endorses good appetite, denies any other symptoms    Review of Systems: Otherwise unremarkable     <<<<<PAST MEDICAL & SURGICAL HISTORY>>>>>  Diabetes mellitus    Hypertension    Type 2 myocardial infarction due to hypertension    Stage 4 chronic kidney disease    Schizophrenia    MDD (major depressive disorder)    Constipation    History of bilateral oophorectomies      ALLERGIES  haloperidol (Unknown)      Home Medications:  ascorbic acid 500 mg oral tablet: 1 tab(s) orally once a day (28 May 2024 00:35)  aspirin 81 mg oral capsule: 1 cap(s) orally once a day (28 May 2024 00:35)  cholecalciferol 50 mcg (2000 intl units) oral tablet: 1 tab(s) orally once a day (28 May 2024 00:35)  docusate sodium 100 mg oral capsule: 2 cap(s) orally once a day (28 May 2024 00:35)  ferrous sulfate 325 mg (65 mg elemental iron) oral tablet: 1 tab(s) orally once a day (28 May 2024 00:35)  hydrALAZINE 25 mg oral tablet: 1 tab(s) orally once a day (28 May 2024 00:36)  hydroCHLOROthiazide 25 mg oral tablet: 1 tab(s) orally once a day (28 May 2024 00:36)  isoniazid 100 mg oral tablet: 0.5 tab(s) orally 2 times a day (28 May 2024 00:36)  isosorbide dinitrate 10 mg oral tablet: 1 tab(s) orally every 8 hours (28 May 2024 00:36)  Lovenox 80 mg/0.8 mL injectable solution: 80 milligram(s) subcutaneously 2 times a day (28 May 2024 00:36)  mirtazapine 15 mg oral tablet: 1 tab(s) orally once a day (at bedtime) (28 May 2024 00:36)  Multiple Vitamins oral tablet: 1 tab(s) orally once a day (28 May 2024 00:36)  pravastatin 80 mg oral tablet: 1 tab(s) orally once a day (at bedtime) (28 May 2024 00:36)        MEDICATIONS  STANDING MEDICATIONS  acetaminophen     Tablet .. 1000 milliGRAM(s) Oral every 8 hours  ascorbic acid 500 milliGRAM(s) Oral daily  atorvastatin 80 milliGRAM(s) Oral at bedtime  cholecalciferol 2000 Unit(s) Oral daily  isosorbide   dinitrate Tablet (ISORDIL) 10 milliGRAM(s) Oral three times a day  mirtazapine 15 milliGRAM(s) Oral at bedtime  multivitamin 1 Tablet(s) Oral daily  pantoprazole    Tablet 40 milliGRAM(s) Oral before breakfast  senna 2 Tablet(s) Oral at bedtime  sodium chloride 0.9%. 1000 milliLiter(s) IV Continuous <Continuous>    PRN MEDICATIONS  oxyCODONE    IR 2.5 milliGRAM(s) Oral every 4 hours PRN    VITALS:  T(F): 98.4  HR: 79  BP: 100/65  RR: 18  SpO2: 97%    <<<<<PHYSICAL EXAM>>>>>  GENERAL: NAD  PULMONARY: Clear to auscultation bilaterally. No wheezing or crackles  CARDIOVASCULAR: Regular rate and rhythm, S1-S2, no murmurs, rubs, or gallops  GASTROINTESTINAL: Hard, diffusely distended, diffuse mild TTP on palpation, improved tenderness compared to yesterday, no guarding  SKIN/EXTREMITIES: Warm, 2+ tibialis posterior pulses, no UE or LE edema  NEUROLOGIC/MUSCULOSKELETAL: AOx4, grossly moving all extremities, no focal deficits    <<<<<LABS>>>>>                        8.5    10.07 )-----------( 224      ( 29 May 2024 17:39 )             24.8     05-29    138  |  105  |  41<H>  ----------------------------<  103<H>  4.7   |  19  |  1.3    Ca    8.5      29 May 2024 07:53  Mg     1.9     05-29    TPro  6.0  /  Alb  3.4<L>  /  TBili  0.5  /  DBili  x   /  AST  11  /  ALT  <5  /  AlkPhos  43  05-29      Urinalysis Basic - ( 29 May 2024 07:53 )    Color: x / Appearance: x / SG: x / pH: x  Gluc: 103 mg/dL / Ketone: x  / Bili: x / Urobili: x   Blood: x / Protein: x / Nitrite: x   Leuk Esterase: x / RBC: x / WBC x   Sq Epi: x / Non Sq Epi: x / Bacteria: x            Urinalysis with Rflx Culture (collected 27 May 2024 20:02)    Culture - Blood (collected 27 May 2024 18:20)  Source: .Blood Blood-Peripheral  Preliminary Report (30 May 2024 04:01):    No growth at 48 Hours    Culture - Blood (collected 27 May 2024 18:20)  Source: .Blood Blood-Peripheral  Preliminary Report (30 May 2024 04:01):    No growth at 48 Hours    [id number]        <<<<<RADIOLOGY>>>>>  CT AP IC (5/27): Patent anterior abdominal wall 16.3 x 9.0 x 9.0 cm collection with layering hyperdensities and surrounding soft tissue stranding. Findings may reflect hematoma in appropriate clinical setting. Additional anterior abdominal wall subcutaneous soft tissue nodular densities, possibly reflecting injection granulomas/hematomas. Left lower lobe 0.5 cm pulmonary nodule.    BL LE venous duplex (5/28): No evidence of deep venous thrombosis in either lower extremity.      -----------------------------------------------------------------------------------------------------------------------------------------------------------------------------------------------

## 2024-05-31 LAB
ANION GAP SERPL CALC-SCNC: 12 MMOL/L — SIGNIFICANT CHANGE UP (ref 7–14)
BASOPHILS # BLD AUTO: 0.02 K/UL — SIGNIFICANT CHANGE UP (ref 0–0.2)
BASOPHILS NFR BLD AUTO: 0.2 % — SIGNIFICANT CHANGE UP (ref 0–1)
BUN SERPL-MCNC: 41 MG/DL — HIGH (ref 10–20)
CALCIUM SERPL-MCNC: 8.4 MG/DL — SIGNIFICANT CHANGE UP (ref 8.4–10.5)
CHLORIDE SERPL-SCNC: 105 MMOL/L — SIGNIFICANT CHANGE UP (ref 98–110)
CO2 SERPL-SCNC: 22 MMOL/L — SIGNIFICANT CHANGE UP (ref 17–32)
CREAT ?TM UR-MCNC: 86 MG/DL — SIGNIFICANT CHANGE UP
CREAT SERPL-MCNC: 1.6 MG/DL — HIGH (ref 0.7–1.5)
EGFR: 34 ML/MIN/1.73M2 — LOW
EOSINOPHIL # BLD AUTO: 0.18 K/UL — SIGNIFICANT CHANGE UP (ref 0–0.7)
EOSINOPHIL NFR BLD AUTO: 2.2 % — SIGNIFICANT CHANGE UP (ref 0–8)
GLUCOSE BLDC GLUCOMTR-MCNC: 115 MG/DL — HIGH (ref 70–99)
GLUCOSE BLDC GLUCOMTR-MCNC: 121 MG/DL — HIGH (ref 70–99)
GLUCOSE SERPL-MCNC: 86 MG/DL — SIGNIFICANT CHANGE UP (ref 70–99)
HCT VFR BLD CALC: 20.7 % — LOW (ref 37–47)
HCT VFR BLD CALC: 22.1 % — LOW (ref 37–47)
HCT VFR BLD CALC: 23 % — LOW (ref 37–47)
HGB BLD-MCNC: 6.8 G/DL — CRITICAL LOW (ref 12–16)
HGB BLD-MCNC: 7.3 G/DL — LOW (ref 12–16)
HGB BLD-MCNC: 7.7 G/DL — LOW (ref 12–16)
IMM GRANULOCYTES NFR BLD AUTO: 0.4 % — HIGH (ref 0.1–0.3)
LYMPHOCYTES # BLD AUTO: 1.83 K/UL — SIGNIFICANT CHANGE UP (ref 1.2–3.4)
LYMPHOCYTES # BLD AUTO: 22.8 % — SIGNIFICANT CHANGE UP (ref 20.5–51.1)
MCHC RBC-ENTMCNC: 28.2 PG — SIGNIFICANT CHANGE UP (ref 27–31)
MCHC RBC-ENTMCNC: 28.5 PG — SIGNIFICANT CHANGE UP (ref 27–31)
MCHC RBC-ENTMCNC: 28.5 PG — SIGNIFICANT CHANGE UP (ref 27–31)
MCHC RBC-ENTMCNC: 32.9 G/DL — SIGNIFICANT CHANGE UP (ref 32–37)
MCHC RBC-ENTMCNC: 33 G/DL — SIGNIFICANT CHANGE UP (ref 32–37)
MCHC RBC-ENTMCNC: 33.5 G/DL — SIGNIFICANT CHANGE UP (ref 32–37)
MCV RBC AUTO: 85.2 FL — SIGNIFICANT CHANGE UP (ref 81–99)
MCV RBC AUTO: 85.9 FL — SIGNIFICANT CHANGE UP (ref 81–99)
MCV RBC AUTO: 86.3 FL — SIGNIFICANT CHANGE UP (ref 81–99)
MONOCYTES # BLD AUTO: 0.82 K/UL — HIGH (ref 0.1–0.6)
MONOCYTES NFR BLD AUTO: 10.2 % — HIGH (ref 1.7–9.3)
NEUTROPHILS # BLD AUTO: 5.16 K/UL — SIGNIFICANT CHANGE UP (ref 1.4–6.5)
NEUTROPHILS NFR BLD AUTO: 64.2 % — SIGNIFICANT CHANGE UP (ref 42.2–75.2)
NRBC # BLD: 0 /100 WBCS — SIGNIFICANT CHANGE UP (ref 0–0)
PLATELET # BLD AUTO: 250 K/UL — SIGNIFICANT CHANGE UP (ref 130–400)
PLATELET # BLD AUTO: 258 K/UL — SIGNIFICANT CHANGE UP (ref 130–400)
PLATELET # BLD AUTO: 286 K/UL — SIGNIFICANT CHANGE UP (ref 130–400)
PMV BLD: 10 FL — SIGNIFICANT CHANGE UP (ref 7.4–10.4)
PMV BLD: 10.2 FL — SIGNIFICANT CHANGE UP (ref 7.4–10.4)
PMV BLD: 10.3 FL — SIGNIFICANT CHANGE UP (ref 7.4–10.4)
POTASSIUM SERPL-MCNC: 4.5 MMOL/L — SIGNIFICANT CHANGE UP (ref 3.5–5)
POTASSIUM SERPL-SCNC: 4.5 MMOL/L — SIGNIFICANT CHANGE UP (ref 3.5–5)
RBC # BLD: 2.41 M/UL — LOW (ref 4.2–5.4)
RBC # BLD: 2.56 M/UL — LOW (ref 4.2–5.4)
RBC # BLD: 2.7 M/UL — LOW (ref 4.2–5.4)
RBC # FLD: 14.9 % — HIGH (ref 11.5–14.5)
RBC # FLD: 15 % — HIGH (ref 11.5–14.5)
RBC # FLD: 15 % — HIGH (ref 11.5–14.5)
SODIUM SERPL-SCNC: 139 MMOL/L — SIGNIFICANT CHANGE UP (ref 135–146)
SODIUM UR-SCNC: 95 MMOL/L — SIGNIFICANT CHANGE UP
WBC # BLD: 7.98 K/UL — SIGNIFICANT CHANGE UP (ref 4.8–10.8)
WBC # BLD: 8.04 K/UL — SIGNIFICANT CHANGE UP (ref 4.8–10.8)
WBC # BLD: 8.28 K/UL — SIGNIFICANT CHANGE UP (ref 4.8–10.8)
WBC # FLD AUTO: 7.98 K/UL — SIGNIFICANT CHANGE UP (ref 4.8–10.8)
WBC # FLD AUTO: 8.04 K/UL — SIGNIFICANT CHANGE UP (ref 4.8–10.8)
WBC # FLD AUTO: 8.28 K/UL — SIGNIFICANT CHANGE UP (ref 4.8–10.8)

## 2024-05-31 PROCEDURE — 74174 CTA ABD&PLVS W/CONTRAST: CPT | Mod: 26

## 2024-05-31 PROCEDURE — 99233 SBSQ HOSP IP/OBS HIGH 50: CPT

## 2024-05-31 RX ORDER — SODIUM CHLORIDE 9 MG/ML
1000 INJECTION, SOLUTION INTRAVENOUS
Refills: 0 | Status: DISCONTINUED | OUTPATIENT
Start: 2024-05-31 | End: 2024-06-01

## 2024-05-31 RX ADMIN — Medication 1000 MILLIGRAM(S): at 06:56

## 2024-05-31 RX ADMIN — POLYETHYLENE GLYCOL 3350 17 GRAM(S): 17 POWDER, FOR SOLUTION ORAL at 17:49

## 2024-05-31 RX ADMIN — ISOSORBIDE DINITRATE 10 MILLIGRAM(S): 5 TABLET ORAL at 11:03

## 2024-05-31 RX ADMIN — Medication 1300 MILLIGRAM(S): at 05:33

## 2024-05-31 RX ADMIN — Medication 1000 MILLIGRAM(S): at 05:33

## 2024-05-31 RX ADMIN — Medication 2000 UNIT(S): at 11:03

## 2024-05-31 RX ADMIN — POLYETHYLENE GLYCOL 3350 17 GRAM(S): 17 POWDER, FOR SOLUTION ORAL at 05:32

## 2024-05-31 RX ADMIN — Medication 1 TABLET(S): at 11:03

## 2024-05-31 RX ADMIN — MIRTAZAPINE 15 MILLIGRAM(S): 45 TABLET, ORALLY DISINTEGRATING ORAL at 21:19

## 2024-05-31 RX ADMIN — Medication 1000 MILLIGRAM(S): at 21:20

## 2024-05-31 RX ADMIN — SODIUM CHLORIDE 100 MILLILITER(S): 9 INJECTION, SOLUTION INTRAVENOUS at 21:22

## 2024-05-31 RX ADMIN — SENNA PLUS 2 TABLET(S): 8.6 TABLET ORAL at 21:19

## 2024-05-31 RX ADMIN — Medication 1000 MILLIGRAM(S): at 15:12

## 2024-05-31 RX ADMIN — Medication 1000 MILLIGRAM(S): at 22:32

## 2024-05-31 RX ADMIN — Medication 500 MILLIGRAM(S): at 11:03

## 2024-05-31 RX ADMIN — PANTOPRAZOLE SODIUM 40 MILLIGRAM(S): 20 TABLET, DELAYED RELEASE ORAL at 05:35

## 2024-05-31 RX ADMIN — ISOSORBIDE DINITRATE 10 MILLIGRAM(S): 5 TABLET ORAL at 05:33

## 2024-05-31 RX ADMIN — ISOSORBIDE DINITRATE 10 MILLIGRAM(S): 5 TABLET ORAL at 17:49

## 2024-05-31 RX ADMIN — ATORVASTATIN CALCIUM 80 MILLIGRAM(S): 80 TABLET, FILM COATED ORAL at 21:19

## 2024-05-31 NOTE — PROGRESS NOTE ADULT - SUBJECTIVE AND OBJECTIVE BOX
VANGIE SEBASTIAN  74y  [facility] [address]      Patient is a 74y old  Female who presents with a chief complaint of abdominal wall hematoma (29 May 2024 05:12)      INTERVAL HPI/OVERNIGHT EVENTS:  Patient feels well  Still with abdominal pain.   no new complains  no overnight events  stated that she is been getting lovenox injection for a yr?         FAMILY HISTORY:    T(C): 37.3 (05-29-24 @ 04:54), Max: 37.6 (05-28-24 @ 21:24)  HR: 89 (05-29-24 @ 04:54) (86 - 103)  BP: 107/75 (05-29-24 @ 04:54) (92/64 - 126/76)  RR: 18 (05-29-24 @ 04:54) (18 - 18)  SpO2: 98% (05-29-24 @ 04:54) (95% - 99%)  Wt(kg): --Vital Signs Last 24 Hrs  T(C): 37.3 (29 May 2024 04:54), Max: 37.6 (28 May 2024 21:24)  T(F): 99.2 (29 May 2024 04:54), Max: 99.6 (28 May 2024 21:24)  HR: 89 (29 May 2024 04:54) (86 - 103)  BP: 107/75 (29 May 2024 04:54) (92/64 - 126/76)  BP(mean): 89 (29 May 2024 04:54) (73 - 97)  RR: 18 (29 May 2024 04:54) (18 - 18)  SpO2: 98% (29 May 2024 04:54) (95% - 99%)    Parameters below as of 29 May 2024 04:54  Patient On (Oxygen Delivery Method): room air        PHYSICAL EXAM:  GENERAL: NAD, well-groomed, well-developed  PULM: Clear to auscultation bilateral  GI: Soft, Nontender, distended; Bowel sounds present  EXTREMITIES:  2+ Peripheral Pulse    Consultant(s) Notes Reviewed:  [x ] YES  [ ] NO  Care Discussed with Consultants/Other Providers [ x] YES  [ ] NO    LABS:                            5.1    6.52  )-----------( 226      ( 28 May 2024 16:55 )             16.2   05-28    133<L>  |  103  |  44<H>  ----------------------------<  295<H>  5.6<H>   |  20  |  1.6<H>    Ca    7.9<L>      28 May 2024 16:55  Mg     1.8     05-28    TPro  6.1  /  Alb  3.5  /  TBili  0.2  /  DBili  x   /  AST  10  /  ALT  <5  /  AlkPhos  49  05-28            Urinalysis with Rflx Culture (collected 27 May 2024 20:02)    Culture - Blood (collected 27 May 2024 18:20)  Source: .Blood Blood-Peripheral  Preliminary Report (29 May 2024 04:02):    No growth at 24 hours    Culture - Blood (collected 27 May 2024 18:20)  Source: .Blood Blood-Peripheral  Preliminary Report (29 May 2024 04:02):    No growth at 24 hours      acetaminophen     Tablet .. 1000 milliGRAM(s) Oral every 8 hours  ascorbic acid 500 milliGRAM(s) Oral daily  atorvastatin 80 milliGRAM(s) Oral at bedtime  cholecalciferol 2000 Unit(s) Oral daily  ferrous    sulfate 325 milliGRAM(s) Oral daily  hydrALAZINE 25 milliGRAM(s) Oral daily  hydrochlorothiazide 25 milliGRAM(s) Oral daily  isosorbide   dinitrate Tablet (ISORDIL) 10 milliGRAM(s) Oral three times a day  mirtazapine 15 milliGRAM(s) Oral at bedtime  multivitamin 1 Tablet(s) Oral daily  oxyCODONE    IR 2.5 milliGRAM(s) Oral every 4 hours PRN  senna 2 Tablet(s) Oral at bedtime        This is a 73 yo F from  w/[facility] of HTN, DM, CKD, schizophrenia, DVT on Lovenox, and surgical history of oophorectomy who was brought in by EMS for burning right lower abdominal pain that started the day before presentation with enlarging mass at the same site, now admitted to medicine for work up and management of abdominal wall hematoma.    1. Abdominal pain due to Abdominal wall mass due to hematoma, less likely infected complicated by acute blood loss anemia s/p 2 PRBC   - Abdominal binder          - UA negative  - Hold AC and aspirin (AC for DVT, aspirin for atherosclerotic heart disease per the chart)  * [facility] records reviewed. pt had no DVT and was on heparin px due to limited mobility and covid   - Pain control with Tylenol and PRN oxycodone  - BCx x 2 (5/27) NGTD  - BL LE venous duplex (5/28): No evidence of deep venous thrombosis in either lower extremity  - CT Abdomen and Pelvis w/ IV Cont IMPRESSION:   a) Patent anterior abdominal wall 16.3 x 9.0 x 9.0 cm collection with layering hyperdensities and surrounding soft tissue stranding. Findings may reflect hematoma in appropriate clinical setting. Additional anterior abdominal wall subcutaneous soft tissue nodular densities, possibly reflecting injection granulomas/hematomas. Left lower lobe 0.5 cm pulmonary nodule.  - Surgery on board, impression is hematoma:   a) no surgical intervention needed, HOLD AC, restart when hgb is stable  - Hgb 8.6 (5/27) -> 6.7 (5/28) -> 5.1 (5/28), now s/p 2 u pRBC, f/u post transfusion hgb  - IR consult recs (5/28): No acute IR intervention because no active bleed demonstrated. Hold lovenox. Monitor H&H and transfuse PRN. Recommend bilateral lower extremity duplex to determine whether pt a candidate for IVC filter.      2. Acute kidney injury on top of ckd stage 3b resolving complicated by hyperkalemia  - S/P two doses of Lokelma   - s/p insulin/dextrose (5/28)  - Was on IVF     3. HTN / HLD  - Stop hydralazine it was ordered daily   - Stop HCTZ (bp seems overcontrolled)   - Cont  pravastatin,   - Cont isosorbide dinitrate     4. Constipation  - Cont laxative     5. No mention why pt should be on Isoniazid       #MISC  -  GI prophylaxis: ppi   -  DVT prophylaxis: scd   -  Full code    Acute blood loss anemia due to abdominal wall hematoma in a pt on therapeutic lovenox ?. No indication for lovenox noted after reviewing [facility] dc summary.   TRACY on top of ckd. IVF today. bun/cr still elevated. less likely due to hematoma  HTN overcontrolled now improved. . stop hctz and hydralazine      VANGIE SEBASTIAN  74y  [facility] [address]      Patient is a 74y old  Female who presents with a chief complaint of abdominal wall hematoma (29 May 2024 05:12)      INTERVAL HPI/OVERNIGHT EVENTS:  Patient feels well  Abdominal pain improving  no overnight events  hb dropped this morning but vital signs stable         FAMILY HISTORY:    T(C): 37.3 (05-29-24 @ 04:54), Max: 37.6 (05-28-24 @ 21:24)  HR: 89 (05-29-24 @ 04:54) (86 - 103)  BP: 107/75 (05-29-24 @ 04:54) (92/64 - 126/76)  RR: 18 (05-29-24 @ 04:54) (18 - 18)  SpO2: 98% (05-29-24 @ 04:54) (95% - 99%)  Wt(kg): --Vital Signs Last 24 Hrs  T(C): 37.3 (29 May 2024 04:54), Max: 37.6 (28 May 2024 21:24)  T(F): 99.2 (29 May 2024 04:54), Max: 99.6 (28 May 2024 21:24)  HR: 89 (29 May 2024 04:54) (86 - 103)  BP: 107/75 (29 May 2024 04:54) (92/64 - 126/76)  BP(mean): 89 (29 May 2024 04:54) (73 - 97)  RR: 18 (29 May 2024 04:54) (18 - 18)  SpO2: 98% (29 May 2024 04:54) (95% - 99%)    Parameters below as of 29 May 2024 04:54  Patient On (Oxygen Delivery Method): room air        PHYSICAL EXAM:  GENERAL: NAD, well-groomed, well-developed  PULM: Clear to auscultation bilateral  GI: Soft, Nontender, distended; Bowel sounds present  EXTREMITIES:  2+ Peripheral Pulse    Consultant(s) Notes Reviewed:  [x ] YES  [ ] NO  Care Discussed with Consultants/Other Providers [ x] YES  [ ] NO    LABS:                            5.1    6.52  )-----------( 226      ( 28 May 2024 16:55 )             16.2   05-28    133<L>  |  103  |  44<H>  ----------------------------<  295<H>  5.6<H>   |  20  |  1.6<H>    Ca    7.9<L>      28 May 2024 16:55  Mg     1.8     05-28    TPro  6.1  /  Alb  3.5  /  TBili  0.2  /  DBili  x   /  AST  10  /  ALT  <5  /  AlkPhos  49  05-28            Urinalysis with Rflx Culture (collected 27 May 2024 20:02)    Culture - Blood (collected 27 May 2024 18:20)  Source: .Blood Blood-Peripheral  Preliminary Report (29 May 2024 04:02):    No growth at 24 hours    Culture - Blood (collected 27 May 2024 18:20)  Source: .Blood Blood-Peripheral  Preliminary Report (29 May 2024 04:02):    No growth at 24 hours      acetaminophen     Tablet .. 1000 milliGRAM(s) Oral every 8 hours  ascorbic acid 500 milliGRAM(s) Oral daily  atorvastatin 80 milliGRAM(s) Oral at bedtime  cholecalciferol 2000 Unit(s) Oral daily  ferrous    sulfate 325 milliGRAM(s) Oral daily  hydrALAZINE 25 milliGRAM(s) Oral daily  hydrochlorothiazide 25 milliGRAM(s) Oral daily  isosorbide   dinitrate Tablet (ISORDIL) 10 milliGRAM(s) Oral three times a day  mirtazapine 15 milliGRAM(s) Oral at bedtime  multivitamin 1 Tablet(s) Oral daily  oxyCODONE    IR 2.5 milliGRAM(s) Oral every 4 hours PRN  senna 2 Tablet(s) Oral at bedtime        This is a 75 yo F from  w/[facility] of HTN, DM, CKD, schizophrenia, DVT on Lovenox, and surgical history of oophorectomy who was brought in by EMS for burning right lower abdominal pain that started the day before presentation with enlarging mass at the same site, now admitted to medicine for work up and management of abdominal wall hematoma.    1. Abdominal pain due to Abdominal wall mass due to hematoma, less likely infected complicated by acute blood loss anemia s/p 3 PRBC   - Abdominal binder          - UA negative  - Hold AC and aspirin (AC for DVT, aspirin for atherosclerotic heart disease per the chart)  * [facility] records reviewed. pt had no DVT and was on heparin px due to limited mobility and covid   - Pain control with Tylenol and PRN oxycodone  - Hgb 8.6 (5/27) -> 6.7 (5/28) -> 5.1 (5/28), now s/p 2 u pRBC  - Receive 2 PRBC. Transfuse another 1 PRBC   - BCx x 2 (5/27) NGTD  - BL LE venous duplex (5/28): No evidence of deep venous thrombosis in either lower extremity  - CT Abdomen and Pelvis w/ IV Cont IMPRESSION:   a) Patent anterior abdominal wall 16.3 x 9.0 x 9.0 cm collection with layering hyperdensities and surrounding soft tissue stranding. Findings may reflect hematoma in appropriate clinical setting. Additional anterior abdominal wall subcutaneous soft tissue nodular densities, possibly reflecting injection granulomas/hematomas. Left lower lobe 0.5 cm pulmonary nodule.  - Surgery on board, impression is hematoma:   a) no surgical intervention needed, HOLD AC, restart when hgb is stable  - IR consult recs (5/28): No acute IR intervention because no active bleed demonstrated. Hold lovenox. Monitor H&H and transfuse PRN. Recommend bilateral lower extremity duplex to determine whether pt a candidate for IVC filter.      2. Acute kidney injury on top of ckd stage 3b resolving complicated by hyperkalemia  - S/P two doses of Lokelma   - s/p insulin/dextrose (5/28)  - Cont IVF with LR at 100 ml/hr (shouldn't increase K as it would decrease acidosis)     3. HTN / HLD  - Stop hydralazine it was ordered daily   - Stop HCTZ (bp seems overcontrolled)   - Cont  pravastatin,   - Cont isosorbide dinitrate     4. Constipation  - Cont laxative     5. No mention why pt should be on Isoniazid       #MISC  -  GI prophylaxis: ppi   -  DVT prophylaxis: scd   -  Full code    Acute blood loss anemia due to abdominal wall hematoma in a pt on therapeutic lovenox ?. No indication for lovenox noted after reviewing [facility] dc summary. transfuse another 1 PRBC   TRACY on top of ckd.  HTN overcontrolled now improved. . stop hctz and hydralazine

## 2024-05-31 NOTE — PHARMACOTHERAPY INTERVENTION NOTE - COMMENTS
-Vancomycin/Zosyn: Received one dose in ED. Preferably avoided in combination as can lead to worsening renal function.    -Mirtazapine: Has sedative effect, can lead to delirium in this age group. Avoid if possible.    -Oxycodone: Can lead to delirium in this age group especially with other sedating med (Mirtazapine). Try tylenol or NSAIDs (if no contraindication) before considering narcotics. Id using narcotics minimize dose.    -Atorvastatin 80mg: At home patient on moderate intensity statin but transition to high intensity statin for unknown reason. Can switch to moderate intensity statin if medically appropriate.    -Hydralazine QD: On hydralazine daily dosing at home. Should be TID dosing. Already stopped by primary team. -Vancomycin/Zosyn: Received one dose in ED. Preferably avoided in combination as can lead to worsening renal function.    -Mirtazapine: Has sedative effect, can lead to delirium in this age group. Avoid if possible.    -Oxycodone: Can lead to delirium in this age group especially with other sedating med (Mirtazapine). Try tylenol or NSAIDs (if no contraindication) before considering narcotics. Id using narcotics minimize dose.    -Atorvastatin 80mg: At home patient on moderate intensity statin but transition to high intensity statin for unknown reason. Can switch to moderate intensity statin if medically appropriate.    -Hydralazine QD: On hydralazine daily dosing at home. Should be TID dosing. Already stopped by primary team.    -Aspirin: On Asx at home for primary prevention, can stop iso large hematoma and unclear benefit.    -Lovenox: Need to evaluate if still needed. If AC still needed can consider DOAC (lower bleeding risk).

## 2024-05-31 NOTE — PROGRESS NOTE ADULT - SUBJECTIVE AND OBJECTIVE BOX
IR contacted due to concerns for downtrending Hgb. CTA 5/31 reviewed. No extravasation visualized. Hematoma appears similar in size from prior study (different morphology due to abdominal binder).   - No embolization at this time.   - Cont. non-invasive measures: hold anticoagulation, cont. abdominal binder, trend Hgb and transfuse PRBC PRN.   - Additionally, no DVT seen on LE venous duplex US and no IVC filter planned.

## 2024-05-31 NOTE — PROGRESS NOTE ADULT - SUBJECTIVE AND OBJECTIVE BOX
----------Daily Progress Note----------    HISTORY OF PRESENT ILLNESS:  Patient is a 75 yo F from [facility] w/PMH of HTN, DM, CKD, schizophrenia, DVT on Lovenox, and surgical history of oophorectomy who was brought in by EMS for burning right lower abdominal pain that started the day before presentation with enlarging mass at the same site without fever, chills, N/V, diarrhea, or constipation, w/ED vitals stable, labs notable for K 6.2, BUN 48, Cr 1.7, and Hgb 8.6, and CT AP notable for 16.3 cm abdominal mass, likely hematoma. Surgery was consulted in the ED, whose impression was hematoma and recommended no acute surgical intervention and IR consult for possible drainage. Patient is now admitted to medicine for work up and management of abdominal wall hematoma.     Today is hospital day 4d.     INTERVAL HOSPITAL COURSE / OVERNIGHT EVENTS:  O/N events:  None    24 hr events:  None    Patient was examined and seen at bedside. Patient reports improvement in abdominal pain, denies any nausea, vomiting, or diarrhea, endorses good appetite, denies any other symptoms    Review of Systems: Otherwise unremarkable     <<<<<PAST MEDICAL & SURGICAL HISTORY>>>>>  Diabetes mellitus    Hypertension    Type 2 myocardial infarction due to hypertension    Stage 4 chronic kidney disease    Schizophrenia    MDD (major depressive disorder)    Constipation    History of bilateral oophorectomies      ALLERGIES  haloperidol (Unknown)      Home Medications:  ascorbic acid 500 mg oral tablet: 1 tab(s) orally once a day (28 May 2024 00:35)  aspirin 81 mg oral capsule: 1 cap(s) orally once a day (28 May 2024 00:35)  cholecalciferol 50 mcg (2000 intl units) oral tablet: 1 tab(s) orally once a day (28 May 2024 00:35)  docusate sodium 100 mg oral capsule: 2 cap(s) orally once a day (28 May 2024 00:35)  ferrous sulfate 325 mg (65 mg elemental iron) oral tablet: 1 tab(s) orally once a day (28 May 2024 00:35)  hydrALAZINE 25 mg oral tablet: 1 tab(s) orally once a day (28 May 2024 00:36)  hydroCHLOROthiazide 25 mg oral tablet: 1 tab(s) orally once a day (28 May 2024 00:36)  isoniazid 100 mg oral tablet: 0.5 tab(s) orally 2 times a day (28 May 2024 00:36)  isosorbide dinitrate 10 mg oral tablet: 1 tab(s) orally every 8 hours (28 May 2024 00:36)  Lovenox 80 mg/0.8 mL injectable solution: 80 milligram(s) subcutaneously 2 times a day (28 May 2024 00:36)  mirtazapine 15 mg oral tablet: 1 tab(s) orally once a day (at bedtime) (28 May 2024 00:36)  Multiple Vitamins oral tablet: 1 tab(s) orally once a day (28 May 2024 00:36)  pravastatin 80 mg oral tablet: 1 tab(s) orally once a day (at bedtime) (28 May 2024 00:36)        MEDICATIONS  STANDING MEDICATIONS  acetaminophen     Tablet .. 1000 milliGRAM(s) Oral every 8 hours  ascorbic acid 500 milliGRAM(s) Oral daily  atorvastatin 80 milliGRAM(s) Oral at bedtime  cholecalciferol 2000 Unit(s) Oral daily  isosorbide   dinitrate Tablet (ISORDIL) 10 milliGRAM(s) Oral three times a day  lactated ringers. 1000 milliLiter(s) IV Continuous <Continuous>  mirtazapine 15 milliGRAM(s) Oral at bedtime  multivitamin 1 Tablet(s) Oral daily  pantoprazole    Tablet 40 milliGRAM(s) Oral before breakfast  polyethylene glycol 3350 17 Gram(s) Oral two times a day  senna 2 Tablet(s) Oral at bedtime  sodium chloride 0.9%. 1000 milliLiter(s) IV Continuous <Continuous>    PRN MEDICATIONS  oxyCODONE    IR 2.5 milliGRAM(s) Oral every 4 hours PRN    VITALS:  T(F): 98.1  HR: 62  BP: 124/74  RR: 18  SpO2: 96%    <<<<<PHYSICAL EXAM>>>>>  GENERAL: NAD  PULMONARY: Clear to auscultation bilaterally. No wheezing or crackles  CARDIOVASCULAR: Regular rate and rhythm, S1-S2, no murmurs, rubs, or gallops  GASTROINTESTINAL: Hard, diffusely distended, diffuse mild TTP on palpation, improved tenderness compared to yesterday, no guarding  SKIN/EXTREMITIES: Warm, 2+ tibialis posterior pulses, no UE or LE edema  NEUROLOGIC/MUSCULOSKELETAL: AOx4, grossly moving all extremities, no focal deficits    <<<<<LABS>>>>>                        8.6    14.21 )-----------( 286      ( 30 May 2024 06:59 )             27.0     05-30    138  |  105  |  40<H>  ----------------------------<  123<H>  4.1   |  22  |  1.7<H>    Ca    8.1<L>      30 May 2024 17:36  Mg     1.9     05-29    TPro  6.0  /  Alb  3.4<L>  /  TBili  0.5  /  DBili  x   /  AST  11  /  ALT  <5  /  AlkPhos  43  05-29      Urinalysis Basic - ( 30 May 2024 17:36 )    Color: x / Appearance: x / SG: x / pH: x  Gluc: 123 mg/dL / Ketone: x  / Bili: x / Urobili: x   Blood: x / Protein: x / Nitrite: x   Leuk Esterase: x / RBC: x / WBC x   Sq Epi: x / Non Sq Epi: x / Bacteria: x        Lactate, Blood: 1.6 mmol/L (05-30-24 @ 17:36)    [id number]        <<<<<RADIOLOGY>>>>>  CT AP IC (5/27): Patent anterior abdominal wall 16.3 x 9.0 x 9.0 cm collection with layering hyperdensities and surrounding soft tissue stranding. Findings may reflect hematoma in appropriate clinical setting. Additional anterior abdominal wall subcutaneous soft tissue nodular densities, possibly reflecting injection granulomas/hematomas. Left lower lobe 0.5 cm pulmonary nodule.    BL LE venous duplex (5/28): No evidence of deep venous thrombosis in either lower extremity.    ----------------------------------------------------------------------------------------------------------------------------------------------------------------------------------------------- ----------Daily Progress Note----------    HISTORY OF PRESENT ILLNESS:  Patient is a 75 yo F from [facility] w/PMH of HTN, DM, CKD, schizophrenia, DVT on Lovenox, and surgical history of oophorectomy who was brought in by EMS for burning right lower abdominal pain that started the day before presentation with enlarging mass at the same site without fever, chills, N/V, diarrhea, or constipation, w/ED vitals stable, labs notable for K 6.2, BUN 48, Cr 1.7, and Hgb 8.6, and CT AP notable for 16.3 cm abdominal mass, likely hematoma. Surgery was consulted in the ED, whose impression was hematoma and recommended no acute surgical intervention and IR consult for possible drainage. Patient is now admitted to medicine for work up and management of abdominal wall hematoma.     Today is hospital day 4d.     INTERVAL HOSPITAL COURSE / OVERNIGHT EVENTS:  O/N events:  None    24 hr events:  None    Patient was examined and seen at bedside. Patient reports improvement in abdominal pain, denies any nausea, vomiting, or diarrhea, endorses good appetite, denies any other symptoms    Review of Systems: Otherwise unremarkable     <<<<<PAST MEDICAL & SURGICAL HISTORY>>>>>  Diabetes mellitus    Hypertension    Type 2 myocardial infarction due to hypertension    Stage 4 chronic kidney disease    Schizophrenia    MDD (major depressive disorder)    Constipation    History of bilateral oophorectomies      ALLERGIES  haloperidol (Unknown)      Home Medications:  ascorbic acid 500 mg oral tablet: 1 tab(s) orally once a day (28 May 2024 00:35)  aspirin 81 mg oral capsule: 1 cap(s) orally once a day (28 May 2024 00:35)  cholecalciferol 50 mcg (2000 intl units) oral tablet: 1 tab(s) orally once a day (28 May 2024 00:35)  docusate sodium 100 mg oral capsule: 2 cap(s) orally once a day (28 May 2024 00:35)  ferrous sulfate 325 mg (65 mg elemental iron) oral tablet: 1 tab(s) orally once a day (28 May 2024 00:35)  hydrALAZINE 25 mg oral tablet: 1 tab(s) orally once a day (28 May 2024 00:36)  hydroCHLOROthiazide 25 mg oral tablet: 1 tab(s) orally once a day (28 May 2024 00:36)  isoniazid 100 mg oral tablet: 0.5 tab(s) orally 2 times a day (28 May 2024 00:36)  isosorbide dinitrate 10 mg oral tablet: 1 tab(s) orally every 8 hours (28 May 2024 00:36)  Lovenox 80 mg/0.8 mL injectable solution: 80 milligram(s) subcutaneously 2 times a day (28 May 2024 00:36)  mirtazapine 15 mg oral tablet: 1 tab(s) orally once a day (at bedtime) (28 May 2024 00:36)  Multiple Vitamins oral tablet: 1 tab(s) orally once a day (28 May 2024 00:36)  pravastatin 80 mg oral tablet: 1 tab(s) orally once a day (at bedtime) (28 May 2024 00:36)        MEDICATIONS  STANDING MEDICATIONS  acetaminophen     Tablet .. 1000 milliGRAM(s) Oral every 8 hours  ascorbic acid 500 milliGRAM(s) Oral daily  atorvastatin 80 milliGRAM(s) Oral at bedtime  cholecalciferol 2000 Unit(s) Oral daily  isosorbide   dinitrate Tablet (ISORDIL) 10 milliGRAM(s) Oral three times a day  lactated ringers. 1000 milliLiter(s) IV Continuous <Continuous>  mirtazapine 15 milliGRAM(s) Oral at bedtime  multivitamin 1 Tablet(s) Oral daily  pantoprazole    Tablet 40 milliGRAM(s) Oral before breakfast  polyethylene glycol 3350 17 Gram(s) Oral two times a day  senna 2 Tablet(s) Oral at bedtime  sodium chloride 0.9%. 1000 milliLiter(s) IV Continuous <Continuous>    PRN MEDICATIONS  oxyCODONE    IR 2.5 milliGRAM(s) Oral every 4 hours PRN    VITALS:  T(F): 98.1  HR: 62  BP: 124/74  RR: 18  SpO2: 96%    <<<<<PHYSICAL EXAM>>>>>  GENERAL: NAD  PULMONARY: Clear to auscultation bilaterally. No wheezing or crackles  CARDIOVASCULAR: Regular rate and rhythm, S1-S2, no murmurs, rubs, or gallops  GASTROINTESTINAL: Hard, diffusely distended, slight TTP on RUQ, no guarding  SKIN/EXTREMITIES: Warm, 2+ tibialis posterior pulses, no UE or LE edema  NEUROLOGIC/MUSCULOSKELETAL: AOx4, grossly moving all extremities, no focal deficits    <<<<<LABS>>>>>                        8.6    14.21 )-----------( 286      ( 30 May 2024 06:59 )             27.0     05-30    138  |  105  |  40<H>  ----------------------------<  123<H>  4.1   |  22  |  1.7<H>    Ca    8.1<L>      30 May 2024 17:36  Mg     1.9     05-29    TPro  6.0  /  Alb  3.4<L>  /  TBili  0.5  /  DBili  x   /  AST  11  /  ALT  <5  /  AlkPhos  43  05-29      Urinalysis Basic - ( 30 May 2024 17:36 )    Color: x / Appearance: x / SG: x / pH: x  Gluc: 123 mg/dL / Ketone: x  / Bili: x / Urobili: x   Blood: x / Protein: x / Nitrite: x   Leuk Esterase: x / RBC: x / WBC x   Sq Epi: x / Non Sq Epi: x / Bacteria: x        Lactate, Blood: 1.6 mmol/L (05-30-24 @ 17:36)    [id number]        <<<<<RADIOLOGY>>>>>  CT AP IC (5/27): Patent anterior abdominal wall 16.3 x 9.0 x 9.0 cm collection with layering hyperdensities and surrounding soft tissue stranding. Findings may reflect hematoma in appropriate clinical setting. Additional anterior abdominal wall subcutaneous soft tissue nodular densities, possibly reflecting injection granulomas/hematomas. Left lower lobe 0.5 cm pulmonary nodule.    BL LE venous duplex (5/28): No evidence of deep venous thrombosis in either lower extremity.    -----------------------------------------------------------------------------------------------------------------------------------------------------------------------------------------------

## 2024-05-31 NOTE — PROGRESS NOTE ADULT - ASSESSMENT
Patient is a 73 yo F from  w/[facility] of HTN, DM, CKD, schizophrenia, DVT on Lovenox, and surgical history of oophorectomy who was brought in by EMS for burning right lower abdominal pain that started the day before presentation with enlarging mass at the same site, now admitted to medicine for work up and management of abdominal wall hematoma.    # Abdominal wall mass   # Probably hematoma, less likely infected   - Complaining from abdominal pain   - CT Abdomen and Pelvis w/ IV Cont IMPRESSION: Patent anterior abdominal wall 16.3 x 9.0 x 9.0 cm collection with layering hyperdensities and surrounding soft tissue stranding. Findings may reflect hematoma in appropriate clinical setting. Additional anterior abdominal wall subcutaneous soft tissue nodular densities, possibly reflecting injection granulomas/hematomas. Left lower lobe 0.5 cm pulmonary nodule.  - Surgery on board, impression is hematoma: no surgical intervention needed, HOLD AC, restart when hgb is stable  - Abdominal binder  - Hold AC and aspirin (AC for DVT, aspirin for atherosclerotic heart disease per the chart but no mention for stents)   - Pain control with Tylenol and PRN oxycodone  - BCx x 2 (5/27) NGTD  - UA negative  - Hgb 8.6 (5/27) -> 6.7 (5/28) -> 5.1 (5/28) -> s/p 2 u pRBC -> 8.6 (5/29)  - WBC 6.52 (5/28)  - Trend hgb, transfuse as needed  - IR consult recs (5/28): No acute IR intervention because no active bleed demonstrated. Hold lovenox. Monitor H&H and transfuse PRN. Recommend bilateral lower extremity duplex to determine whether pt a candidate for IVC filter.  - BL LE venous duplex (5/28): No evidence of deep venous thrombosis in either lower extremity  - Check [facility] records to determine if AC should be restarted or not    # TRACY on CKD, TRACY likely prerenal, possibly ATN from blood loss and hypotension, possibly 2/2 obstruction due to hematoma  # Hyperkalemia (resolved)  # HTN   # HLD  - Cr 1.7 -> 1.6 -> 1.3 -> 1.7 (5/30)  - BUN 44 -> 39 (5/30)  - K 6.2 -> 5.0 -> 5.7 -> 4.7 -> 3.9 (5/30)  - S/P two doses of Lokelma   - s/p insulin/dextrose (5/28)  - Stop hydralazine and HCTZ  - Continue pravastatin and isosorbide dinitrate   - Start  cc/hr for 10 hrs  - Monitor K and Cr  - RBUS (5/30): Essentially nondiagnostic exam due to body habitus and edema. Kidneys not visualized. Notably there was no ureteral obstruction on prior CT from 3 days earlier.    #HAGMA  - Bicarb 19 (5/29) -> 15 (5/30)  - AG 14 (5/29) -> 21 (5/30)  - Pending lactate at 4 pm  - s/p bicarb 1300 mg tid for 3 doses    # Constipation  - c/w senna and miralax    #MISC  -  GI prophylaxis: none  -  DVT prophylaxis: SCDs  -  Full code  -  Diet: Low K, DASH, CC, Renal diet    Pending: Discharge today     Patient is a 75 yo F from  w/[facility] of HTN, DM, CKD, schizophrenia, DVT on Lovenox, and surgical history of oophorectomy who was brought in by EMS for burning right lower abdominal pain that started the day before presentation with enlarging mass at the same site, now admitted to medicine for work up and management of abdominal wall hematoma.    # Abdominal wall mass   # Probably hematoma, less likely infected   - Complaining from abdominal pain   - CT Abdomen and Pelvis w/ IV Cont IMPRESSION: Patent anterior abdominal wall 16.3 x 9.0 x 9.0 cm collection with layering hyperdensities and surrounding soft tissue stranding. Findings may reflect hematoma in appropriate clinical setting. Additional anterior abdominal wall subcutaneous soft tissue nodular densities, possibly reflecting injection granulomas/hematomas. Left lower lobe 0.5 cm pulmonary nodule.  - Surgery on board, impression is hematoma: no surgical intervention needed, HOLD AC, restart when hgb is stable  - Abdominal binder  - Hold AC and aspirin (AC for DVT, aspirin for atherosclerotic heart disease per the chart but no mention for stents)   - Pain control with Tylenol and PRN oxycodone  - BCx x 2 (5/27) NGTD  - UA negative  - Hgb 8.6 (5/27) -> 6.7 (5/28) -> 5.1 (5/28) -> s/p 2 u pRBC -> 8.6 (5/29) -> 7.3 (5/31)  - WBC 6.52 (5/28)  - Trend hgb, transfuse as needed  - IR consult recs (5/28): No acute IR intervention because no active bleed demonstrated. Hold lovenox. Monitor H&H and transfuse PRN. Recommend bilateral lower extremity duplex to determine whether pt a candidate for IVC filter.  - BL LE venous duplex (5/28): No evidence of deep venous thrombosis in either lower extremity  - No record of DVT on [facility] Records,   - Pending 4 pm CBC    # TRACY on CKD, TRACY likely prerenal, possibly ATN from blood loss and hypotension, possibly 2/2 obstruction due to hematoma  # Hyperkalemia (resolved)  # HTN   # HLD  - Cr 1.7 -> 1.6 -> 1.3 -> 1.7 (5/30)  - BUN 44 -> 39 (5/30)  - K 6.2 -> 5.0 -> 5.7 -> 4.7 -> 3.9 (5/30)  - S/P two doses of Lokelma   - s/p insulin/dextrose (5/28)  - Stop hydralazine and HCTZ  - Continue pravastatin and isosorbide dinitrate   - s/p  cc/hr for 10 hrs  - Monitor K and Cr  - RBUS (5/30): Essentially nondiagnostic exam due to body habitus and edema. Kidneys not visualized. Notably there was no ureteral obstruction on prior CT from 3 days earlier.    #HAGMA  - Bicarb 19 (5/29) -> 15 (5/30)  - AG 14 (5/29) -> 21 (5/30)  - lactate 1.6  - s/p bicarb 1300 mg tid for 3 doses  - f/u AM labs    # Constipation  - c/w senna and miralax    #MISC  -  GI prophylaxis: none  -  DVT prophylaxis: SCDs  -  Full code  -  Diet: Low K, DASH, CC, Renal diet    Pending: F/u 4 pm CBC     Patient is a 75 yo F from  w/[facility] of HTN, DM, CKD, schizophrenia, DVT on Lovenox, and surgical history of oophorectomy who was brought in by EMS for burning right lower abdominal pain that started the day before presentation with enlarging mass at the same site, now admitted to medicine for work up and management of abdominal wall hematoma.    # Abdominal wall mass   # Probably hematoma, less likely infected   - Complaining from abdominal pain   - CT Abdomen and Pelvis w/ IV Cont IMPRESSION: Patent anterior abdominal wall 16.3 x 9.0 x 9.0 cm collection with layering hyperdensities and surrounding soft tissue stranding. Findings may reflect hematoma in appropriate clinical setting. Additional anterior abdominal wall subcutaneous soft tissue nodular densities, possibly reflecting injection granulomas/hematomas. Left lower lobe 0.5 cm pulmonary nodule.  - Surgery on board, impression is hematoma: no surgical intervention needed, HOLD AC, restart when hgb is stable  - Abdominal binder  - Hold AC and aspirin (AC for DVT, aspirin for atherosclerotic heart disease per the chart but no mention for stents)   - Pain control with Tylenol and PRN oxycodone  - BCx x 2 (5/27) NGTD  - UA negative  - Hgb 8.6 (5/27) -> 6.7 (5/28) -> 5.1 (5/28) -> s/p 2 u pRBC -> 8.6 (5/29) -> 7.3 (5/31)  - WBC 6.52 (5/28)  - Trend hgb, transfuse as needed  - IR consult recs (5/28): No acute IR intervention because no active bleed demonstrated. Hold lovenox. Monitor H&H and transfuse PRN. Recommend bilateral lower extremity duplex to determine whether pt a candidate for IVC filter.  - BL LE venous duplex (5/28): No evidence of deep venous thrombosis in either lower extremity  - No record of DVT on [facility] Records  - Pending 11 am CBC and type and screen    # TRACY on CKD, TRACY likely prerenal, possibly ATN from blood loss and hypotension, possibly 2/2 obstruction due to hematoma  # Hyperkalemia (resolved)  # HTN   # HLD  - Cr 1.7 -> 1.6 -> 1.3 -> 1.7 (5/30)  - BUN 44 -> 39 (5/30)  - K 6.2 -> 5.0 -> 5.7 -> 4.7 -> 3.9 (5/30)  - S/P two doses of Lokelma   - s/p insulin/dextrose (5/28)  - Stop hydralazine and HCTZ  - Continue pravastatin and isosorbide dinitrate   - s/p  cc/hr for 10 hrs  - Monitor K and Cr  - RBUS (5/30): Essentially nondiagnostic exam due to body habitus and edema. Kidneys not visualized. Notably there was no ureteral obstruction on prior CT from 3 days earlier.    #HAGMA  - Bicarb 19 (5/29) -> 15 (5/30)  - AG 14 (5/29) -> 21 (5/30)  - lactate 1.6  - s/p bicarb 1300 mg tid for 3 doses  - f/u AM BMP    # Constipation  - c/w senna and miralax    #MISC  -  GI prophylaxis: none  -  DVT prophylaxis: SCDs  -  Full code  -  Diet: Low K, DASH, CC, Renal diet    Pending: F/u 11 am CBC and type and screen     Patient is a 75 yo F from  w/[facility] of HTN, DM, CKD, schizophrenia, DVT on Lovenox, and surgical history of oophorectomy who was brought in by EMS for burning right lower abdominal pain that started the day before presentation with enlarging mass at the same site, now admitted to medicine for work up and management of abdominal wall hematoma.    # Abdominal wall mass   # Probably hematoma, less likely infected   - Complaining from abdominal pain   - CT Abdomen and Pelvis w/ IV Cont IMPRESSION: Patent anterior abdominal wall 16.3 x 9.0 x 9.0 cm collection with layering hyperdensities and surrounding soft tissue stranding. Findings may reflect hematoma in appropriate clinical setting. Additional anterior abdominal wall subcutaneous soft tissue nodular densities, possibly reflecting injection granulomas/hematomas. Left lower lobe 0.5 cm pulmonary nodule.  - Surgery on board, impression is hematoma: no surgical intervention needed, HOLD AC, restart when hgb is stable  - Abdominal binder  - Hold AC and aspirin (AC for DVT, aspirin for atherosclerotic heart disease per the chart but no mention for stents)   - Pain control with Tylenol and PRN oxycodone  - BCx x 2 (5/27) NGTD  - UA negative  - Hgb 8.6 (5/27) -> 6.7 (5/28) -> 5.1 (5/28) -> s/p 2 u pRBC -> 8.6 (5/29) -> 7.3 (5/31)  - WBC 6.52 (5/28)  - Trend hgb, transfuse as needed  - IR consult recs (5/28): No acute IR intervention because no active bleed demonstrated. Hold lovenox. Monitor H&H and transfuse PRN. Recommend bilateral lower extremity duplex to determine whether pt a candidate for IVC filter.  - BL LE venous duplex (5/28): No evidence of deep venous thrombosis in either lower extremity  - No record of DVT on [facility] Records  - Pending 11 am CBC and type and screen    # TRACY on CKD, TRACY likely prerenal, possibly ATN from blood loss and hypotension, possibly 2/2 obstruction due to hematoma  # Hyperkalemia (resolved)  # HTN   # HLD  - Cr 1.7 -> 1.6 -> 1.3 -> 1.7 (5/30)  - BUN 44 -> 39 (5/30)  - K 6.2 -> 5.0 -> 5.7 -> 4.7 -> 3.9 (5/30)  - S/P two doses of Lokelma   - s/p insulin/dextrose (5/28)  - Stop hydralazine and HCTZ  - Continue pravastatin and isosorbide dinitrate   - s/p  cc/hr for 10 hrs  - Monitor K and Cr  - RBUS (5/30): Essentially nondiagnostic exam due to body habitus and edema. Kidneys not visualized. Notably there was no ureteral obstruction on prior CT from 3 days earlier.    #HAGMA (resolved)  - Bicarb 19 (5/29) -> 15 (5/30) -> 22 (5/31)  - AG 14 (5/29) -> 21 (5/30) -> 12 (5/31)  - lactate 1.6  - s/p bicarb 1300 mg tid for 3 doses    # Constipation  - c/w senna and miralax    #MISC  -  GI prophylaxis: none  -  DVT prophylaxis: SCDs  -  Full code  -  Diet: Low K, DASH, CC, Renal diet    Pending: F/u 11 am CBC and type and screen     Patient is a 75 yo F from  w/[facility] of HTN, DM, CKD, schizophrenia, DVT on Lovenox, and surgical history of oophorectomy who was brought in by EMS for burning right lower abdominal pain that started the day before presentation with enlarging mass at the same site, now admitted to medicine for work up and management of abdominal wall hematoma.    # Abdominal wall mass   # Probably hematoma, less likely infected   - Complaining from abdominal pain   - CT Abdomen and Pelvis w/ IV Cont IMPRESSION: Patent anterior abdominal wall 16.3 x 9.0 x 9.0 cm collection with layering hyperdensities and surrounding soft tissue stranding. Findings may reflect hematoma in appropriate clinical setting. Additional anterior abdominal wall subcutaneous soft tissue nodular densities, possibly reflecting injection granulomas/hematomas. Left lower lobe 0.5 cm pulmonary nodule.  - Surgery on board, impression is hematoma: no surgical intervention needed, HOLD AC, restart when hgb is stable  - Abdominal binder  - Hold AC and aspirin (AC for DVT, aspirin for atherosclerotic heart disease per the chart but no mention for stents)   - Pain control with Tylenol and PRN oxycodone  - BCx x 2 (5/27) NGTD  - UA negative  - Hgb 8.6 (5/27) -> 6.7 (5/28) -> 5.1 (5/28) -> s/p 2 u pRBC -> 8.6 (5/29) -> 7.3 (5/31)  - WBC 6.52 (5/28)  - Trend hgb, transfuse as needed  - IR consult recs (5/28): No acute IR intervention because no active bleed demonstrated. Hold lovenox. Monitor H&H and transfuse PRN. Recommend bilateral lower extremity duplex to determine whether pt a candidate for IVC filter.  - BL LE venous duplex (5/28): No evidence of deep venous thrombosis in either lower extremity  - No record of DVT on [facility] Records  - Pending 11 am CBC and type and screen    # TRACY on CKD, TRACY likely prerenal, possibly ATN from blood loss and hypotension, possibly 2/2 obstruction due to hematoma  # Hyperkalemia (resolved)  # HTN   # HLD  - Cr 1.7 -> 1.6 -> 1.3 -> 1.7 (5/30) -> 1.6 (5/31)  - BUN 44 -> 39 (5/30) -> 41 (5/31)  - K 6.2 -> 5.0 -> 5.7 -> 4.7 -> 3.9 (5/30) -> 4.5 (5/31)  - S/P two doses of Lokelma   - s/p insulin/dextrose (5/28)  - Stop hydralazine and HCTZ  - Continue pravastatin and isosorbide dinitrate   - s/p  cc/hr for 10 hrs  - Monitor K and Cr  - f/u urine lytes  - RBUS (5/30): Essentially nondiagnostic exam due to body habitus and edema. Kidneys not visualized. Notably there was no ureteral obstruction on prior CT from 3 days earlier.    #HAGMA (resolved)  - Bicarb 19 (5/29) -> 15 (5/30) -> 22 (5/31)  - AG 14 (5/29) -> 21 (5/30) -> 12 (5/31)  - lactate 1.6  - s/p bicarb 1300 mg tid for 3 doses    # Constipation  - c/w senna and miralax    #MISC  -  GI prophylaxis: none  -  DVT prophylaxis: SCDs  -  Full code  -  Diet: Low K, DASH, CC, Renal diet    Pending: F/u 11 am CBC and type and screen, urine lytes

## 2024-06-01 LAB
ANION GAP SERPL CALC-SCNC: 11 MMOL/L — SIGNIFICANT CHANGE UP (ref 7–14)
BUN SERPL-MCNC: 34 MG/DL — HIGH (ref 10–20)
CALCIUM SERPL-MCNC: 8.3 MG/DL — LOW (ref 8.4–10.5)
CHLORIDE SERPL-SCNC: 106 MMOL/L — SIGNIFICANT CHANGE UP (ref 98–110)
CO2 SERPL-SCNC: 24 MMOL/L — SIGNIFICANT CHANGE UP (ref 17–32)
CREAT SERPL-MCNC: 1.3 MG/DL — SIGNIFICANT CHANGE UP (ref 0.7–1.5)
EGFR: 43 ML/MIN/1.73M2 — LOW
GLUCOSE BLDC GLUCOMTR-MCNC: 106 MG/DL — HIGH (ref 70–99)
GLUCOSE SERPL-MCNC: 88 MG/DL — SIGNIFICANT CHANGE UP (ref 70–99)
HCT VFR BLD CALC: 23.5 % — LOW (ref 37–47)
HGB BLD-MCNC: 7.8 G/DL — LOW (ref 12–16)
MCHC RBC-ENTMCNC: 28.5 PG — SIGNIFICANT CHANGE UP (ref 27–31)
MCHC RBC-ENTMCNC: 33.2 G/DL — SIGNIFICANT CHANGE UP (ref 32–37)
MCV RBC AUTO: 85.8 FL — SIGNIFICANT CHANGE UP (ref 81–99)
NRBC # BLD: 0 /100 WBCS — SIGNIFICANT CHANGE UP (ref 0–0)
PLATELET # BLD AUTO: 298 K/UL — SIGNIFICANT CHANGE UP (ref 130–400)
PMV BLD: 10.3 FL — SIGNIFICANT CHANGE UP (ref 7.4–10.4)
POTASSIUM SERPL-MCNC: 4.5 MMOL/L — SIGNIFICANT CHANGE UP (ref 3.5–5)
POTASSIUM SERPL-SCNC: 4.5 MMOL/L — SIGNIFICANT CHANGE UP (ref 3.5–5)
RBC # BLD: 2.74 M/UL — LOW (ref 4.2–5.4)
RBC # FLD: 15.2 % — HIGH (ref 11.5–14.5)
SODIUM SERPL-SCNC: 141 MMOL/L — SIGNIFICANT CHANGE UP (ref 135–146)
WBC # BLD: 6.91 K/UL — SIGNIFICANT CHANGE UP (ref 4.8–10.8)
WBC # FLD AUTO: 6.91 K/UL — SIGNIFICANT CHANGE UP (ref 4.8–10.8)

## 2024-06-01 PROCEDURE — 99232 SBSQ HOSP IP/OBS MODERATE 35: CPT

## 2024-06-01 PROCEDURE — 74018 RADEX ABDOMEN 1 VIEW: CPT | Mod: 26

## 2024-06-01 RX ORDER — SODIUM CHLORIDE 9 MG/ML
1000 INJECTION, SOLUTION INTRAVENOUS
Refills: 0 | Status: DISCONTINUED | OUTPATIENT
Start: 2024-06-01 | End: 2024-06-02

## 2024-06-01 RX ORDER — MAGNESIUM HYDROXIDE 400 MG/1
30 TABLET, CHEWABLE ORAL ONCE
Refills: 0 | Status: DISCONTINUED | OUTPATIENT
Start: 2024-06-01 | End: 2024-06-02

## 2024-06-01 RX ORDER — IRON SUCROSE 20 MG/ML
200 INJECTION, SOLUTION INTRAVENOUS
Refills: 0 | Status: DISCONTINUED | OUTPATIENT
Start: 2024-06-01 | End: 2024-06-02

## 2024-06-01 RX ADMIN — Medication 2000 UNIT(S): at 11:21

## 2024-06-01 RX ADMIN — MIRTAZAPINE 15 MILLIGRAM(S): 45 TABLET, ORALLY DISINTEGRATING ORAL at 22:27

## 2024-06-01 RX ADMIN — ISOSORBIDE DINITRATE 10 MILLIGRAM(S): 5 TABLET ORAL at 05:15

## 2024-06-01 RX ADMIN — Medication 500 MILLIGRAM(S): at 11:21

## 2024-06-01 RX ADMIN — Medication 1 TABLET(S): at 11:20

## 2024-06-01 RX ADMIN — ISOSORBIDE DINITRATE 10 MILLIGRAM(S): 5 TABLET ORAL at 11:21

## 2024-06-01 RX ADMIN — Medication 1000 MILLIGRAM(S): at 06:07

## 2024-06-01 RX ADMIN — IRON SUCROSE 110 MILLIGRAM(S): 20 INJECTION, SOLUTION INTRAVENOUS at 08:02

## 2024-06-01 RX ADMIN — ATORVASTATIN CALCIUM 80 MILLIGRAM(S): 80 TABLET, FILM COATED ORAL at 22:27

## 2024-06-01 RX ADMIN — Medication 1000 MILLIGRAM(S): at 14:03

## 2024-06-01 RX ADMIN — ISOSORBIDE DINITRATE 10 MILLIGRAM(S): 5 TABLET ORAL at 17:20

## 2024-06-01 RX ADMIN — Medication 1000 MILLIGRAM(S): at 23:30

## 2024-06-01 RX ADMIN — Medication 1000 MILLIGRAM(S): at 13:03

## 2024-06-01 RX ADMIN — Medication 1000 MILLIGRAM(S): at 22:28

## 2024-06-01 RX ADMIN — SODIUM CHLORIDE 100 MILLILITER(S): 9 INJECTION, SOLUTION INTRAVENOUS at 11:21

## 2024-06-01 RX ADMIN — Medication 1000 MILLIGRAM(S): at 05:15

## 2024-06-01 RX ADMIN — PANTOPRAZOLE SODIUM 40 MILLIGRAM(S): 20 TABLET, DELAYED RELEASE ORAL at 05:15

## 2024-06-01 NOTE — PROGRESS NOTE ADULT - ASSESSMENT
Patient is a 73 yo F from  w/PMH of HTN, DM, CKD, schizophrenia, DVT on Lovenox, and surgical history of oophorectomy who was brought in by EMS for burning right lower abdominal pain that started the day before presentation with enlarging mass at the same site, now admitted to medicine for work up and management of abdominal wall hematoma.    # Abdominal wall mass   # Probably hematoma, less likely infected   - Complaining from abdominal pain   - CT Abdomen and Pelvis w/ IV Cont IMPRESSION: Patent anterior abdominal wall 16.3 x 9.0 x 9.0 cm collection with layering hyperdensities and surrounding soft tissue stranding. Findings may reflect hematoma in appropriate clinical setting. Additional anterior abdominal wall subcutaneous soft tissue nodular densities, possibly reflecting injection granulomas/hematomas. Left lower lobe 0.5 cm pulmonary nodule.  - Surgery on board, impression is hematoma: no surgical intervention needed, HOLD AC, restart when hgb is stable  - Abdominal binder  - Hold AC and aspirin (AC for DVT, aspirin for atherosclerotic heart disease per the chart but no mention for stents)   - Pain control with Tylenol and PRN oxycodone  - BCx x 2 (5/27) NGTD  - UA negative  - Hgb 8.6 (5/27) -> 6.7 (5/28) -> 5.1 (5/28) -> s/p 2 u pRBC -> 8.6 (5/29) -> 7.3 (5/31) -> 6.8 (5/31) -> s/p 1 u pRBC -> 7.7 (5/31)  - WBC 6.52 (5/28)  - Trend hgb, transfuse as needed  - IR consult recs (5/28): No acute IR intervention because no active bleed demonstrated. Hold lovenox. Monitor H&H and transfuse PRN. Recommend bilateral lower extremity duplex to determine whether pt a candidate for IVC filter.  - BL LE venous duplex (5/28): No evidence of deep venous thrombosis in either lower extremity  - No record of DVT on [facility] Records  - CTA abdomen (5/31) showing no sign of active bleed  - As per IR recs (5/31), no need for intervention as there is no active bleed on CTA Abdomen and no real increase in size of hematoma  - Start venofer 200 mg q48h, to be discharged on PO iron supplements    # TRACY on CKD, TRACY likely prerenal, possibly ATN from blood loss and hypotension, possibly 2/2 obstruction due to hematoma  # Hyperkalemia (resolved)  # HTN   # HLD  - Cr 1.7 -> 1.6 -> 1.3 -> 1.7 (5/30) -> 1.6 (5/31)  - BUN 44 -> 39 (5/30) -> 41 (5/31)  - K 6.2 -> 5.0 -> 5.7 -> 4.7 -> 3.9 (5/30) -> 4.5 (5/31)  - S/P two doses of Lokelma   - s/p insulin/dextrose (5/28)  - Stop hydralazine and HCTZ  - Continue pravastatin and isosorbide dinitrate   - s/p  cc/hr for 10 hrs  - Monitor K and Cr  - f/u urine lytes  - RBUS (5/30): Essentially nondiagnostic exam due to body habitus and edema. Kidneys not visualized. Notably there was no ureteral obstruction on prior CT from 3 days earlier.    #HAGMA (resolved)  - Bicarb 19 (5/29) -> 15 (5/30) -> 22 (5/31)  - AG 14 (5/29) -> 21 (5/30) -> 12 (5/31)  - lactate 1.6  - s/p bicarb 1300 mg tid for 3 doses    # Constipation  - c/w senna and miralax    #MISC  -  GI prophylaxis: none  -  DVT prophylaxis: SCDs  -  Full code  -  Diet: Low K, DASH, CC, Renal diet    Pending: Possible discharge today, f/u AM hgb     Patient is a 73 yo F from  w/PMH of HTN, DM, CKD, schizophrenia, DVT on Lovenox, and surgical history of oophorectomy who was brought in by EMS for burning right lower abdominal pain that started the day before presentation with enlarging mass at the same site, now admitted to medicine for work up and management of abdominal wall hematoma.    # Abdominal wall mass   # Probably hematoma, less likely infected   - Complaining from abdominal pain   - CT Abdomen and Pelvis w/ IV Cont IMPRESSION: Patent anterior abdominal wall 16.3 x 9.0 x 9.0 cm collection with layering hyperdensities and surrounding soft tissue stranding. Findings may reflect hematoma in appropriate clinical setting. Additional anterior abdominal wall subcutaneous soft tissue nodular densities, possibly reflecting injection granulomas/hematomas. Left lower lobe 0.5 cm pulmonary nodule.  - Surgery on board, impression is hematoma: no surgical intervention needed, HOLD AC, restart when hgb is stable  - Abdominal binder  - Hold AC and aspirin (AC for DVT, aspirin for atherosclerotic heart disease per the chart but no mention for stents)   - Pain control with Tylenol and PRN oxycodone  - BCx x 2 (5/27) NGTD  - UA negative  - Hgb 8.6 (5/27) -> 6.7 (5/28) -> 5.1 (5/28) -> s/p 2 u pRBC -> 8.6 (5/29) -> 7.3 (5/31) -> 6.8 (5/31) -> s/p 1 u pRBC -> 7.7 (5/31) -> 7.8 (6/1)  - WBC 6.52 (5/28)  - Trend hgb, transfuse as needed  - IR consult recs (5/28): No acute IR intervention because no active bleed demonstrated. Hold lovenox. Monitor H&H and transfuse PRN. Recommend bilateral lower extremity duplex to determine whether pt a candidate for IVC filter.  - BL LE venous duplex (5/28): No evidence of deep venous thrombosis in either lower extremity  - No record of DVT on [facility]  - CTA abdomen (5/31) showing no sign of active bleed  - As per IR recs (5/31), no need for intervention as there is no active bleed on CTA Abdomen and no real increase in size of hematoma  - Start venofer 200 mg q48h, to be discharged on PO iron supplements    # TRACY on CKD, TRACY likely prerenal, possibly ATN from blood loss and hypotension, possibly 2/2 obstruction due to hematoma  # Hyperkalemia (resolved)  # HTN   # HLD  - Cr 1.7 -> 1.6 -> 1.3 -> 1.7 (5/30) -> 1.6 (5/31)  - BUN 44 -> 39 (5/30) -> 41 (5/31)  - K 6.2 -> 5.0 -> 5.7 -> 4.7 -> 3.9 (5/30) -> 4.5 (5/31)  - S/P two doses of Lokelma   - s/p insulin/dextrose (5/28)  - Stop hydralazine and HCTZ  - Continue pravastatin and isosorbide dinitrate   - s/p  cc/hr for 10 hrs  - Monitor K and Cr  - f/u urine lytes  - RBUS (5/30): Essentially nondiagnostic exam due to body habitus and edema. Kidneys not visualized. Notably there was no ureteral obstruction on prior CT from 3 days earlier.    #HAGMA (resolved)  - Bicarb 19 (5/29) -> 15 (5/30) -> 22 (5/31)  - AG 14 (5/29) -> 21 (5/30) -> 12 (5/31)  - lactate 1.6  - s/p bicarb 1300 mg tid for 3 doses    # Constipation  - c/w senna and miralax    #MISC  -  GI prophylaxis: none  -  DVT prophylaxis: SCDs  -  Full code  -  Diet: Low K, DASH, CC, Renal diet    Pending: Discharge pending auth     Patient is a 75 yo F from  w/PMH of HTN, DM, CKD, schizophrenia, DVT on Lovenox, and surgical history of oophorectomy who was brought in by EMS for burning right lower abdominal pain that started the day before presentation with enlarging mass at the same site, now admitted to medicine for work up and management of abdominal wall hematoma.    # Abdominal wall mass   # Probably hematoma, less likely infected   - Complaining from abdominal pain   - CT Abdomen and Pelvis w/ IV Cont IMPRESSION: Patent anterior abdominal wall 16.3 x 9.0 x 9.0 cm collection with layering hyperdensities and surrounding soft tissue stranding. Findings may reflect hematoma in appropriate clinical setting. Additional anterior abdominal wall subcutaneous soft tissue nodular densities, possibly reflecting injection granulomas/hematomas. Left lower lobe 0.5 cm pulmonary nodule.  - Surgery on board, impression is hematoma: no surgical intervention needed, HOLD AC, restart when hgb is stable  - Abdominal binder  - Hold AC and aspirin (AC for DVT, aspirin for atherosclerotic heart disease per the chart but no mention for stents)   - Pain control with Tylenol and PRN oxycodone  - BCx x 2 (5/27) NGTD  - UA negative  - Hgb 8.6 (5/27) -> 6.7 (5/28) -> 5.1 (5/28) -> s/p 2 u pRBC -> 8.6 (5/29) -> 7.3 (5/31) -> 6.8 (5/31) -> s/p 1 u pRBC -> 7.7 (5/31) -> 7.8 (6/1)  - WBC 6.52 (5/28)  - Trend hgb, transfuse as needed  - IR consult recs (5/28): No acute IR intervention because no active bleed demonstrated. Hold lovenox. Monitor H&H and transfuse PRN. Recommend bilateral lower extremity duplex to determine whether pt a candidate for IVC filter.  - BL LE venous duplex (5/28): No evidence of deep venous thrombosis in either lower extremity  - No record of DVT on [facility]  - CTA abdomen (5/31) showing no sign of active bleed  - As per IR recs (5/31), no need for intervention as there is no active bleed on CTA Abdomen and no real increase in size of hematoma  - Start venofer 200 mg q48h, to be discharged on PO iron supplements    # TRACY on CKD, TRACY likely prerenal, possibly ATN from blood loss and hypotension, possibly 2/2 obstruction due to hematoma (improving)  # Hyperkalemia (resolved)  # HTN   # HLD  - Cr 1.7 -> 1.6 -> 1.3 -> 1.7 (5/30) -> 1.6 (5/31) -> 1.3 (6/1)  - BUN 44 -> 39 (5/30) -> 41 (5/31) -> 34 (6/1)  - K 6.2 -> 5.0 -> 5.7 -> 4.7 -> 3.9 (5/30) -> 4.5 (5/31) -> 4.5 (6/1)  - S/P two doses of Lokelma   - s/p insulin/dextrose (5/28)  - Stop hydralazine and HCTZ  - Continue pravastatin and isosorbide dinitrate   - s/p  cc/hr for 10 hrs  - Monitor K and Cr  - f/u urine lytes  - RBUS (5/30): Essentially nondiagnostic exam due to body habitus and edema. Kidneys not visualized. Notably there was no ureteral obstruction on prior CT from 3 days earlier.    #HAGMA (resolved)  - Bicarb 19 (5/29) -> 15 (5/30) -> 22 (5/31)  - AG 14 (5/29) -> 21 (5/30) -> 12 (5/31)  - lactate 1.6  - s/p bicarb 1300 mg tid for 3 doses    # Constipation  - c/w senna and miralax    #MISC  -  GI prophylaxis: none  -  DVT prophylaxis: SCDs  -  Full code  -  Diet: Low K, DASH, CC, Renal diet    Pending: Discharge pending auth     Patient is a 75 yo F from  w/PMH of HTN, DM, CKD, schizophrenia, DVT on Lovenox, and surgical history of oophorectomy who was brought in by EMS for burning right lower abdominal pain that started the day before presentation with enlarging mass at the same site, now admitted to medicine for work up and management of abdominal wall hematoma.    # Abdominal wall mass   # Probably hematoma, less likely infected   - Complaining from abdominal pain   - CT Abdomen and Pelvis w/ IV Cont IMPRESSION: Patent anterior abdominal wall 16.3 x 9.0 x 9.0 cm collection with layering hyperdensities and surrounding soft tissue stranding. Findings may reflect hematoma in appropriate clinical setting. Additional anterior abdominal wall subcutaneous soft tissue nodular densities, possibly reflecting injection granulomas/hematomas. Left lower lobe 0.5 cm pulmonary nodule.  - Surgery on board, impression is hematoma: no surgical intervention needed, HOLD AC, restart when hgb is stable  - Abdominal binder  - Hold AC and aspirin (AC for DVT, aspirin for atherosclerotic heart disease per the chart but no mention for stents)   - Pain control with Tylenol and PRN oxycodone  - BCx x 2 (5/27) NGTD  - UA negative  - Hgb 8.6 (5/27) -> 6.7 (5/28) -> 5.1 (5/28) -> s/p 2 u pRBC -> 8.6 (5/29) -> 7.3 (5/31) -> 6.8 (5/31) -> s/p 1 u pRBC -> 7.7 (5/31) -> 7.8 (6/1)  - WBC 6.52 (5/28)  - Trend hgb, transfuse as needed  - IR consult recs (5/28): No acute IR intervention because no active bleed demonstrated. Hold lovenox. Monitor H&H and transfuse PRN. Recommend bilateral lower extremity duplex to determine whether pt a candidate for IVC filter.  - BL LE venous duplex (5/28): No evidence of deep venous thrombosis in either lower extremity  - Patient had a provoked DVT on 1/8/2024 while at [facility], provoked from COVID infection, has been treated with AC for over 3 months so no further need for AC  - CTA abdomen (5/31) showing no sign of active bleed  - As per IR recs (5/31), no need for intervention as there is no active bleed on CTA Abdomen and no real increase in size of hematoma  - Start venofer 200 mg q48h, to be discharged on PO iron supplements    # TRACY on CKD, TRACY likely prerenal, possibly ATN from blood loss and hypotension, possibly 2/2 obstruction due to hematoma (improving)  # Hyperkalemia (resolved)  # HTN   # HLD  - Cr 1.7 -> 1.6 -> 1.3 -> 1.7 (5/30) -> 1.6 (5/31) -> 1.3 (6/1)  - BUN 44 -> 39 (5/30) -> 41 (5/31) -> 34 (6/1)  - K 6.2 -> 5.0 -> 5.7 -> 4.7 -> 3.9 (5/30) -> 4.5 (5/31) -> 4.5 (6/1)  - S/P two doses of Lokelma   - s/p insulin/dextrose (5/28)  - Stop hydralazine and HCTZ  - Continue pravastatin and isosorbide dinitrate   - s/p  cc/hr for 10 hrs  - Monitor K and Cr  - f/u urine lytes  - RBUS (5/30): Essentially nondiagnostic exam due to body habitus and edema. Kidneys not visualized. Notably there was no ureteral obstruction on prior CT from 3 days earlier.    #HAGMA (resolved)  - Bicarb 19 (5/29) -> 15 (5/30) -> 22 (5/31)  - AG 14 (5/29) -> 21 (5/30) -> 12 (5/31)  - lactate 1.6  - s/p bicarb 1300 mg tid for 3 doses    # Constipation  - c/w senna and miralax    #MISC  -  GI prophylaxis: none  -  DVT prophylaxis: SCDs  -  Full code  -  Diet: Low K, DASH, CC, Renal diet    Pending: Discharge pending auth

## 2024-06-01 NOTE — PROGRESS NOTE ADULT - PROVIDER SPECIALTY LIST ADULT
Hospitalist
Internal Medicine
Surgery
Hospitalist
Internal Medicine
Internal Medicine
Intervent Radiology
Surgery
Internal Medicine

## 2024-06-01 NOTE — PROGRESS NOTE ADULT - REASON FOR ADMISSION
abdominal wall hematoma

## 2024-06-01 NOTE — PROGRESS NOTE ADULT - SUBJECTIVE AND OBJECTIVE BOX
----------Daily Progress Note----------    HISTORY OF PRESENT ILLNESS:  Patient is a 75 yo F from [facility] w/PMH of HTN, DM, CKD, schizophrenia, DVT on Lovenox, and surgical history of oophorectomy who was brought in by EMS for burning right lower abdominal pain that started the day before presentation with enlarging mass at the same site without fever, chills, N/V, diarrhea, or constipation, w/ED vitals stable, labs notable for K 6.2, BUN 48, Cr 1.7, and Hgb 8.6, and CT AP notable for 16.3 cm abdominal mass, likely hematoma. Surgery was consulted in the ED, whose impression was hematoma and recommended no acute surgical intervention and IR consult for possible drainage. Patient is now admitted to medicine for work up and management of abdominal wall hematoma.     Today is hospital day 5d.     INTERVAL HOSPITAL COURSE / OVERNIGHT EVENTS:  O/N events:  None    24 hr events:  None    Patient was examined and seen at bedside. Patient reports improvement in abdominal pain, denies any nausea, vomiting, or diarrhea, endorses good appetite, denies any other symptoms    Review of Systems: Otherwise unremarkable     <<<<<PAST MEDICAL & SURGICAL HISTORY>>>>>  Diabetes mellitus    Hypertension    Type 2 myocardial infarction due to hypertension    Stage 4 chronic kidney disease    Schizophrenia    MDD (major depressive disorder)    Constipation    History of bilateral oophorectomies      ALLERGIES  haloperidol (Unknown)      Home Medications:  ascorbic acid 500 mg oral tablet: 1 tab(s) orally once a day (28 May 2024 00:35)  aspirin 81 mg oral capsule: 1 cap(s) orally once a day (28 May 2024 00:35)  cholecalciferol 50 mcg (2000 intl units) oral tablet: 1 tab(s) orally once a day (28 May 2024 00:35)  docusate sodium 100 mg oral capsule: 2 cap(s) orally once a day (28 May 2024 00:35)  ferrous sulfate 325 mg (65 mg elemental iron) oral tablet: 1 tab(s) orally once a day (28 May 2024 00:35)  hydrALAZINE 25 mg oral tablet: 1 tab(s) orally once a day (28 May 2024 00:36)  hydroCHLOROthiazide 25 mg oral tablet: 1 tab(s) orally once a day (28 May 2024 00:36)  isoniazid 100 mg oral tablet: 0.5 tab(s) orally 2 times a day (28 May 2024 00:36)  isosorbide dinitrate 10 mg oral tablet: 1 tab(s) orally every 8 hours (28 May 2024 00:36)  Lovenox 80 mg/0.8 mL injectable solution: 80 milligram(s) subcutaneously 2 times a day (28 May 2024 00:36)  mirtazapine 15 mg oral tablet: 1 tab(s) orally once a day (at bedtime) (28 May 2024 00:36)  Multiple Vitamins oral tablet: 1 tab(s) orally once a day (28 May 2024 00:36)  pravastatin 80 mg oral tablet: 1 tab(s) orally once a day (at bedtime) (28 May 2024 00:36)        MEDICATIONS  STANDING MEDICATIONS  acetaminophen     Tablet .. 1000 milliGRAM(s) Oral every 8 hours  ascorbic acid 500 milliGRAM(s) Oral daily  atorvastatin 80 milliGRAM(s) Oral at bedtime  cholecalciferol 2000 Unit(s) Oral daily  isosorbide   dinitrate Tablet (ISORDIL) 10 milliGRAM(s) Oral three times a day  lactated ringers. 1000 milliLiter(s) IV Continuous <Continuous>  lactated ringers. 1000 milliLiter(s) IV Continuous <Continuous>  mirtazapine 15 milliGRAM(s) Oral at bedtime  multivitamin 1 Tablet(s) Oral daily  pantoprazole    Tablet 40 milliGRAM(s) Oral before breakfast  polyethylene glycol 3350 17 Gram(s) Oral two times a day  senna 2 Tablet(s) Oral at bedtime  sodium chloride 0.9%. 1000 milliLiter(s) IV Continuous <Continuous>    PRN MEDICATIONS  oxyCODONE    IR 2.5 milliGRAM(s) Oral every 4 hours PRN    VITALS:  T(F): 98.6  HR: 85  BP: 124/71  RR: 18  SpO2: 96%    <<<<<PHYSICAL EXAM>>>>>  GENERAL: NAD  PULMONARY: Clear to auscultation bilaterally. No wheezing or crackles  CARDIOVASCULAR: Regular rate and rhythm, S1-S2, no murmurs, rubs, or gallops  GASTROINTESTINAL: Hard, diffusely distended, slight TTP on RUQ, no guarding  SKIN/EXTREMITIES: Warm, 2+ tibialis posterior pulses, no UE or LE edema  NEUROLOGIC/MUSCULOSKELETAL: AOx4, grossly moving all extremities, no focal deficits    <<<<<LABS>>>>>                        7.7    8.28  )-----------( 286      ( 31 May 2024 20:51 )             23.0     05-31    139  |  105  |  41<H>  ----------------------------<  86  4.5   |  22  |  1.6<H>    Ca    8.4      31 May 2024 05:56        Urinalysis Basic - ( 31 May 2024 05:56 )    Color: x / Appearance: x / SG: x / pH: x  Gluc: 86 mg/dL / Ketone: x  / Bili: x / Urobili: x   Blood: x / Protein: x / Nitrite: x   Leuk Esterase: x / RBC: x / WBC x   Sq Epi: x / Non Sq Epi: x / Bacteria: x          [id number]        <<<<<RADIOLOGY>>>>>      -----------------------------------------------------------------------------------------------------------------------------------------------------------------------------------------------

## 2024-06-01 NOTE — PROGRESS NOTE ADULT - SUBJECTIVE AND OBJECTIVE BOX
VANGIE SEBASTIAN  74y  [facility] [address]      Patient is a 74y old  Female who presents with a chief complaint of abdominal wall hematoma (29 May 2024 05:12)      INTERVAL HPI/OVERNIGHT EVENTS:  Patient feels well  Abdominal pain improving  no overnight events  hb dropped this morning but vital signs stable         FAMILY HISTORY:    T(C): 37.3 (05-29-24 @ 04:54), Max: 37.6 (05-28-24 @ 21:24)  HR: 89 (05-29-24 @ 04:54) (86 - 103)  BP: 107/75 (05-29-24 @ 04:54) (92/64 - 126/76)  RR: 18 (05-29-24 @ 04:54) (18 - 18)  SpO2: 98% (05-29-24 @ 04:54) (95% - 99%)  Wt(kg): --Vital Signs Last 24 Hrs  T(C): 37.3 (29 May 2024 04:54), Max: 37.6 (28 May 2024 21:24)  T(F): 99.2 (29 May 2024 04:54), Max: 99.6 (28 May 2024 21:24)  HR: 89 (29 May 2024 04:54) (86 - 103)  BP: 107/75 (29 May 2024 04:54) (92/64 - 126/76)  BP(mean): 89 (29 May 2024 04:54) (73 - 97)  RR: 18 (29 May 2024 04:54) (18 - 18)  SpO2: 98% (29 May 2024 04:54) (95% - 99%)    Parameters below as of 29 May 2024 04:54  Patient On (Oxygen Delivery Method): room air        PHYSICAL EXAM:  GENERAL: NAD, well-groomed, well-developed  PULM: Clear to auscultation bilateral  GI: Soft, Nontender, distended; Bowel sounds present  EXTREMITIES:  2+ Peripheral Pulse    Consultant(s) Notes Reviewed:  [x ] YES  [ ] NO  Care Discussed with Consultants/Other Providers [ x] YES  [ ] NO    LABS:                            5.1    6.52  )-----------( 226      ( 28 May 2024 16:55 )             16.2   05-28    133<L>  |  103  |  44<H>  ----------------------------<  295<H>  5.6<H>   |  20  |  1.6<H>    Ca    7.9<L>      28 May 2024 16:55  Mg     1.8     05-28    TPro  6.1  /  Alb  3.5  /  TBili  0.2  /  DBili  x   /  AST  10  /  ALT  <5  /  AlkPhos  49  05-28            Urinalysis with Rflx Culture (collected 27 May 2024 20:02)    Culture - Blood (collected 27 May 2024 18:20)  Source: .Blood Blood-Peripheral  Preliminary Report (29 May 2024 04:02):    No growth at 24 hours    Culture - Blood (collected 27 May 2024 18:20)  Source: .Blood Blood-Peripheral  Preliminary Report (29 May 2024 04:02):    No growth at 24 hours      acetaminophen     Tablet .. 1000 milliGRAM(s) Oral every 8 hours  ascorbic acid 500 milliGRAM(s) Oral daily  atorvastatin 80 milliGRAM(s) Oral at bedtime  cholecalciferol 2000 Unit(s) Oral daily  ferrous    sulfate 325 milliGRAM(s) Oral daily  hydrALAZINE 25 milliGRAM(s) Oral daily  hydrochlorothiazide 25 milliGRAM(s) Oral daily  isosorbide   dinitrate Tablet (ISORDIL) 10 milliGRAM(s) Oral three times a day  mirtazapine 15 milliGRAM(s) Oral at bedtime  multivitamin 1 Tablet(s) Oral daily  oxyCODONE    IR 2.5 milliGRAM(s) Oral every 4 hours PRN  senna 2 Tablet(s) Oral at bedtime        This is a 75 yo F from  w/PMH of HTN, DM, CKD, schizophrenia, DVT on Lovenox, and surgical history of oophorectomy who was brought in by EMS for burning right lower abdominal pain that started the day before presentation with enlarging mass at the same site, now admitted to medicine for work up and management of abdominal wall hematoma.    1. Abdominal pain due to Abdominal wall mass due to hematoma, less likely infected complicated by acute blood loss anemia s/p 3 PRBC   - Abdominal binder          - UA negative  - Hold AC and aspirin (AC for DVT, aspirin for atherosclerotic heart disease per the chart)  * [facility] records reviewed. pt had no DVT and was on heparin px due to limited mobility and covid   - Pain control with Tylenol and PRN oxycodone  - Hgb 8.6 (5/27) -> 6.7 (5/28) -> 5.1 (5/28), now s/p 2 u pRBC  - Receive 2 PRBC. Transfuse another 1 PRBC   - Venofer 200mg IV daily d:2  - BCx x 2 (5/27) NGTD  - BL LE venous duplex (5/28): No evidence of deep venous thrombosis in either lower extremity  - CT Abdomen and Pelvis w/ IV Cont IMPRESSION:   a) Patent anterior abdominal wall 16.3 x 9.0 x 9.0 cm collection with layering hyperdensities and surrounding soft tissue stranding. Findings may reflect hematoma in appropriate clinical setting. Additional anterior abdominal wall subcutaneous soft tissue nodular densities, possibly reflecting injection granulomas/hematomas. Left lower lobe 0.5 cm pulmonary nodule.  - Repeat CT abdomen pelvis:  a) No CTA evidence of active arterial hemorrhage.  b) Slight interval increase of right lower abdominal wall hematoma, now measuring 19.2 x 7.3 cm (previously 15.3 x 8.2 cm). Layering/higher density material within the dependent portions of the collection. This may reflect recent or prior venous hemorrhage.  c) Stool impaction of the rectum measuring up to 7.4 cm.  - Surgery on board, impression is hematoma:   a) no surgical intervention needed, HOLD AC, restart when hgb is stable  - IR consult and follow up on 03/31/2024:  a) Hematoma appears similar in size from prior study (different morphology due to abdominal binder).   b) No embolization at this time.   c) Cont. non-invasive measures: hold anticoagulation, cont. abdominal binder, trend Hgb and transfuse PRBC PRN.   d) Additionally, no DVT seen on LE venous duplex US and no IVC filter planned.      2. Acute kidney injury on top of ckd stage 3b resolving complicated by hyperkalemia  - S/P two doses of Lokelma   - s/p insulin/dextrose (5/28)  - Cont IVF with LR at 100 ml/hr (shouldn't increase K as it would decrease acidosis)     3. HTN / HLD  - Stop hydralazine it was ordered daily   - Stop HCTZ (bp seems overcontrolled)   - Cont  pravastatin,   - Cont isosorbide dinitrate     4. Constipation persistent.   - KUB:pending   - Cont laxative . Milk of mg extra today     5. No mention why pt should be on Isoniazid       #MISC  -  GI prophylaxis: ppi   -  DVT prophylaxis: scd   -  Full code    Acute blood loss anemia due to abdominal wall hematoma in a pt on therapeutic lovenox ? s/p 3 PRBC . No indication for lovenox noted after reviewing [facility] dc summary.Repeat CT angio : hematoma looks stable as per IR just different morphology  TRACY on top of ckd resolving   HTN overcontrolled now improved. . stop hctz and hydralazine      VANGIE SEBASTIAN  74y  [facility] [address]      Patient is a 74y old  Female who presents with a chief complaint of abdominal wall hematoma (29 May 2024 05:12)      INTERVAL HPI/OVERNIGHT EVENTS:  Patient feels well  Abdominal pain resolved  had bm this morning  no overnight events  hb stable         FAMILY HISTORY:    T(C): 37.3 (05-29-24 @ 04:54), Max: 37.6 (05-28-24 @ 21:24)  HR: 89 (05-29-24 @ 04:54) (86 - 103)  BP: 107/75 (05-29-24 @ 04:54) (92/64 - 126/76)  RR: 18 (05-29-24 @ 04:54) (18 - 18)  SpO2: 98% (05-29-24 @ 04:54) (95% - 99%)  Wt(kg): --Vital Signs Last 24 Hrs  T(C): 37.3 (29 May 2024 04:54), Max: 37.6 (28 May 2024 21:24)  T(F): 99.2 (29 May 2024 04:54), Max: 99.6 (28 May 2024 21:24)  HR: 89 (29 May 2024 04:54) (86 - 103)  BP: 107/75 (29 May 2024 04:54) (92/64 - 126/76)  BP(mean): 89 (29 May 2024 04:54) (73 - 97)  RR: 18 (29 May 2024 04:54) (18 - 18)  SpO2: 98% (29 May 2024 04:54) (95% - 99%)    Parameters below as of 29 May 2024 04:54  Patient On (Oxygen Delivery Method): room air        PHYSICAL EXAM:  GENERAL: NAD, well-groomed, well-developed  PULM: Clear to auscultation bilateral  GI: Soft, Nontender, distended; Bowel sounds present  EXTREMITIES:  2+ Peripheral Pulse    Consultant(s) Notes Reviewed:  [x ] YES  [ ] NO  Care Discussed with Consultants/Other Providers [ x] YES  [ ] NO    LABS:                            5.1    6.52  )-----------( 226      ( 28 May 2024 16:55 )             16.2   05-28    133<L>  |  103  |  44<H>  ----------------------------<  295<H>  5.6<H>   |  20  |  1.6<H>    Ca    7.9<L>      28 May 2024 16:55  Mg     1.8     05-28    TPro  6.1  /  Alb  3.5  /  TBili  0.2  /  DBili  x   /  AST  10  /  ALT  <5  /  AlkPhos  49  05-28            Urinalysis with Rflx Culture (collected 27 May 2024 20:02)    Culture - Blood (collected 27 May 2024 18:20)  Source: .Blood Blood-Peripheral  Preliminary Report (29 May 2024 04:02):    No growth at 24 hours    Culture - Blood (collected 27 May 2024 18:20)  Source: .Blood Blood-Peripheral  Preliminary Report (29 May 2024 04:02):    No growth at 24 hours      acetaminophen     Tablet .. 1000 milliGRAM(s) Oral every 8 hours  ascorbic acid 500 milliGRAM(s) Oral daily  atorvastatin 80 milliGRAM(s) Oral at bedtime  cholecalciferol 2000 Unit(s) Oral daily  ferrous    sulfate 325 milliGRAM(s) Oral daily  hydrALAZINE 25 milliGRAM(s) Oral daily  hydrochlorothiazide 25 milliGRAM(s) Oral daily  isosorbide   dinitrate Tablet (ISORDIL) 10 milliGRAM(s) Oral three times a day  mirtazapine 15 milliGRAM(s) Oral at bedtime  multivitamin 1 Tablet(s) Oral daily  oxyCODONE    IR 2.5 milliGRAM(s) Oral every 4 hours PRN  senna 2 Tablet(s) Oral at bedtime        This is a 75 yo F from  w/PMH of HTN, DM, CKD, schizophrenia, DVT on Lovenox, and surgical history of oophorectomy who was brought in by EMS for burning right lower abdominal pain that started the day before presentation with enlarging mass at the same site, now admitted to medicine for work up and management of abdominal wall hematoma.    1. Abdominal pain due to Abdominal wall mass due to hematoma, less likely infected complicated by acute blood loss anemia s/p 3 PRBC   - Abdominal binder          - UA negative  - Hold AC and aspirin (AC for DVT, aspirin for atherosclerotic heart disease per the chart)  * [facility] records reviewed. pt had no DVT and was on heparin px due to limited mobility and covid   - Pain control with Tylenol and PRN oxycodone  - Hgb 8.6 (5/27) -> 6.7 (5/28) -> 5.1 (5/28), now s/p 2 u pRBC  - Receive 2 PRBC. Transfuse another 1 PRBC   - Venofer 200mg IV daily d:2  - BCx x 2 (5/27) NGTD  - BL LE venous duplex (5/28): No evidence of deep venous thrombosis in either lower extremity  - CT Abdomen and Pelvis w/ IV Cont IMPRESSION:   a) Patent anterior abdominal wall 16.3 x 9.0 x 9.0 cm collection with layering hyperdensities and surrounding soft tissue stranding. Findings may reflect hematoma in appropriate clinical setting. Additional anterior abdominal wall subcutaneous soft tissue nodular densities, possibly reflecting injection granulomas/hematomas. Left lower lobe 0.5 cm pulmonary nodule.  - Repeat CT abdomen pelvis:  a) No CTA evidence of active arterial hemorrhage.  b) Slight interval increase of right lower abdominal wall hematoma, now measuring 19.2 x 7.3 cm (previously 15.3 x 8.2 cm). Layering/higher density material within the dependent portions of the collection. This may reflect recent or prior venous hemorrhage.  c) Stool impaction of the rectum measuring up to 7.4 cm.  - Surgery on board, impression is hematoma:   a) no surgical intervention needed, HOLD AC, restart when hgb is stable  - IR consult and follow up on 03/31/2024:  a) Hematoma appears similar in size from prior study (different morphology due to abdominal binder).   b) No embolization at this time.   c) Cont. non-invasive measures: hold anticoagulation, cont. abdominal binder, trend Hgb and transfuse PRBC PRN.   d) Additionally, no DVT seen on LE venous duplex US and no IVC filter planned.      2. Acute kidney injury on top of ckd stage 3b resolving complicated by hyperkalemia  - S/P two doses of Lokelma   - s/p insulin/dextrose (5/28)  - Cont IVF with LR at 100 ml/hr (shouldn't increase K as it would decrease acidosis)     3. HTN / HLD  - Stop hydralazine it was ordered daily   - Stop HCTZ (bp seems overcontrolled)   - Cont  pravastatin,   - Cont isosorbide dinitrate     4. Constipation resolved   - KUB:constipation but had bm after  - Cont laxative .     5. No mention why pt should be on Isoniazid       #MISC  -  GI prophylaxis: ppi   -  DVT prophylaxis: scd   -  Full code    Acute blood loss anemia due to abdominal wall hematoma in a pt on therapeutic lovenox ? s/p 3 PRBC . No indication for lovenox noted after reviewing [facility] dc summary.Repeat CT angio : hematoma looks stable as per IR just different morphology  TRACY on top of ckd resolving   HTN overcontrolled now improved. . stop hctz and hydralazine     Patient will be medically clear for dc tmr.      VANGIE SEBASTIAN  74y  [facility] [address]      Patient is a 74y old  Female who presents with a chief complaint of abdominal wall hematoma (29 May 2024 05:12)      INTERVAL HPI/OVERNIGHT EVENTS:  Patient feels well  Abdominal pain resolved  had bm this morning  no overnight events  hb stable         FAMILY HISTORY:    T(C): 37.3 (05-29-24 @ 04:54), Max: 37.6 (05-28-24 @ 21:24)  HR: 89 (05-29-24 @ 04:54) (86 - 103)  BP: 107/75 (05-29-24 @ 04:54) (92/64 - 126/76)  RR: 18 (05-29-24 @ 04:54) (18 - 18)  SpO2: 98% (05-29-24 @ 04:54) (95% - 99%)  Wt(kg): --Vital Signs Last 24 Hrs  T(C): 37.3 (29 May 2024 04:54), Max: 37.6 (28 May 2024 21:24)  T(F): 99.2 (29 May 2024 04:54), Max: 99.6 (28 May 2024 21:24)  HR: 89 (29 May 2024 04:54) (86 - 103)  BP: 107/75 (29 May 2024 04:54) (92/64 - 126/76)  BP(mean): 89 (29 May 2024 04:54) (73 - 97)  RR: 18 (29 May 2024 04:54) (18 - 18)  SpO2: 98% (29 May 2024 04:54) (95% - 99%)    Parameters below as of 29 May 2024 04:54  Patient On (Oxygen Delivery Method): room air        PHYSICAL EXAM:  GENERAL: NAD, well-groomed, well-developed  PULM: Clear to auscultation bilateral  GI: Soft, Nontender, distended; Bowel sounds present  EXTREMITIES:  2+ Peripheral Pulse    Consultant(s) Notes Reviewed:  [x ] YES  [ ] NO  Care Discussed with Consultants/Other Providers [ x] YES  [ ] NO    LABS:                            5.1    6.52  )-----------( 226      ( 28 May 2024 16:55 )             16.2   05-28    133<L>  |  103  |  44<H>  ----------------------------<  295<H>  5.6<H>   |  20  |  1.6<H>    Ca    7.9<L>      28 May 2024 16:55  Mg     1.8     05-28    TPro  6.1  /  Alb  3.5  /  TBili  0.2  /  DBili  x   /  AST  10  /  ALT  <5  /  AlkPhos  49  05-28            Urinalysis with Rflx Culture (collected 27 May 2024 20:02)    Culture - Blood (collected 27 May 2024 18:20)  Source: .Blood Blood-Peripheral  Preliminary Report (29 May 2024 04:02):    No growth at 24 hours    Culture - Blood (collected 27 May 2024 18:20)  Source: .Blood Blood-Peripheral  Preliminary Report (29 May 2024 04:02):    No growth at 24 hours      acetaminophen     Tablet .. 1000 milliGRAM(s) Oral every 8 hours  ascorbic acid 500 milliGRAM(s) Oral daily  atorvastatin 80 milliGRAM(s) Oral at bedtime  cholecalciferol 2000 Unit(s) Oral daily  ferrous    sulfate 325 milliGRAM(s) Oral daily  hydrALAZINE 25 milliGRAM(s) Oral daily  hydrochlorothiazide 25 milliGRAM(s) Oral daily  isosorbide   dinitrate Tablet (ISORDIL) 10 milliGRAM(s) Oral three times a day  mirtazapine 15 milliGRAM(s) Oral at bedtime  multivitamin 1 Tablet(s) Oral daily  oxyCODONE    IR 2.5 milliGRAM(s) Oral every 4 hours PRN  senna 2 Tablet(s) Oral at bedtime        This is a 75 yo F from  w/PMH of HTN, DM, CKD, schizophrenia, DVT on Lovenox, and surgical history of oophorectomy who was brought in by EMS for burning right lower abdominal pain that started the day before presentation with enlarging mass at the same site, now admitted to medicine for work up and management of abdominal wall hematoma.    1. Abdominal pain due to Abdominal wall mass due to hematoma, less likely infected complicated by acute blood loss anemia s/p 3 PRBC   - Abdominal binder          - UA negative  - Hold AC and aspirin (AC for DVT, aspirin for atherosclerotic heart disease per the chart)  * [facility] records reviewed. pt had no DVT and was on heparin px due to limited mobility and covid   - Pain control with Tylenol and PRN oxycodone  - Hgb 8.6 (5/27) -> 6.7 (5/28) -> 5.1 (5/28), now s/p 2 u pRBC  - Receive 2 PRBC. Transfuse another 1 PRBC   - Venofer 200mg IV daily d:2  - BCx x 2 (5/27) NGTD  - BL LE venous duplex (5/28): No evidence of deep venous thrombosis in either lower extremity  - CT Abdomen and Pelvis w/ IV Cont IMPRESSION:   a) Patent anterior abdominal wall 16.3 x 9.0 x 9.0 cm collection with layering hyperdensities and surrounding soft tissue stranding. Findings may reflect hematoma in appropriate clinical setting. Additional anterior abdominal wall subcutaneous soft tissue nodular densities, possibly reflecting injection granulomas/hematomas. Left lower lobe 0.5 cm pulmonary nodule.  - Repeat CT abdomen pelvis:  a) No CTA evidence of active arterial hemorrhage.  b) Slight interval increase of right lower abdominal wall hematoma, now measuring 19.2 x 7.3 cm (previously 15.3 x 8.2 cm). Layering/higher density material within the dependent portions of the collection. This may reflect recent or prior venous hemorrhage.  c) Stool impaction of the rectum measuring up to 7.4 cm.  - Surgery on board, impression is hematoma:   a) no surgical intervention needed, HOLD AC, restart when hgb is stable  - IR consult and follow up on 03/31/2024:  a) Hematoma appears similar in size from prior study (different morphology due to abdominal binder).   b) No embolization at this time.   c) Cont. non-invasive measures: hold anticoagulation, cont. abdominal binder, trend Hgb and transfuse PRBC PRN.   d) Additionally, no DVT seen on LE venous duplex US and no IVC filter planned.      2. Acute kidney injury on top of ckd stage 3b resolving complicated by hyperkalemia  - S/P two doses of Lokelma   - s/p insulin/dextrose (5/28)  - Cont IVF with LR at 100 ml/hr (shouldn't increase K as it would decrease acidosis)     3. HTN / HLD  - Stop hydralazine it was ordered daily   - Stop HCTZ (bp seems overcontrolled)   - Cont  pravastatin,   - Cont isosorbide dinitrate     4. Constipation resolved   - KUB:constipation but had bm after  - Cont laxative .     5. No mention why pt should be on Isoniazid     6.hx of  Provoked DVT 01/2024   - Finished anticoag. no need for further anticoag       #MISC  -  GI prophylaxis: ppi   -  DVT prophylaxis: scd   -  Full code    Acute blood loss anemia due to abdominal wall hematoma in a pt on therapeutic lovenox ? s/p 3 PRBC .Repeat CT angio : hematoma looks stable as per IR just different morphology  TRACY on top of ckd resolving   HTN overcontrolled now improved. . stop hctz and hydralazine   had DVT provoked by Covid 01/2024. repeat venous duplex:WNL. no need for further anticoag. 3 months should be sufficient in her case     Patient will be medically clear for dc tmr.

## 2024-06-02 VITALS
HEART RATE: 85 BPM | DIASTOLIC BLOOD PRESSURE: 74 MMHG | SYSTOLIC BLOOD PRESSURE: 123 MMHG | OXYGEN SATURATION: 95 % | RESPIRATION RATE: 18 BRPM | TEMPERATURE: 99 F

## 2024-06-02 LAB
ANION GAP SERPL CALC-SCNC: 10 MMOL/L — SIGNIFICANT CHANGE UP (ref 7–14)
BUN SERPL-MCNC: 28 MG/DL — HIGH (ref 10–20)
CALCIUM SERPL-MCNC: 8.3 MG/DL — LOW (ref 8.4–10.5)
CHLORIDE SERPL-SCNC: 107 MMOL/L — SIGNIFICANT CHANGE UP (ref 98–110)
CO2 SERPL-SCNC: 24 MMOL/L — SIGNIFICANT CHANGE UP (ref 17–32)
CREAT SERPL-MCNC: 1.2 MG/DL — SIGNIFICANT CHANGE UP (ref 0.7–1.5)
CULTURE RESULTS: SIGNIFICANT CHANGE UP
CULTURE RESULTS: SIGNIFICANT CHANGE UP
EGFR: 48 ML/MIN/1.73M2 — LOW
GLUCOSE SERPL-MCNC: 88 MG/DL — SIGNIFICANT CHANGE UP (ref 70–99)
HCT VFR BLD CALC: 23.8 % — LOW (ref 37–47)
HGB BLD-MCNC: 7.6 G/DL — LOW (ref 12–16)
MCHC RBC-ENTMCNC: 27.9 PG — SIGNIFICANT CHANGE UP (ref 27–31)
MCHC RBC-ENTMCNC: 31.9 G/DL — LOW (ref 32–37)
MCV RBC AUTO: 87.5 FL — SIGNIFICANT CHANGE UP (ref 81–99)
NRBC # BLD: 0 /100 WBCS — SIGNIFICANT CHANGE UP (ref 0–0)
PLATELET # BLD AUTO: 293 K/UL — SIGNIFICANT CHANGE UP (ref 130–400)
PMV BLD: 9.7 FL — SIGNIFICANT CHANGE UP (ref 7.4–10.4)
POTASSIUM SERPL-MCNC: 4.6 MMOL/L — SIGNIFICANT CHANGE UP (ref 3.5–5)
POTASSIUM SERPL-SCNC: 4.6 MMOL/L — SIGNIFICANT CHANGE UP (ref 3.5–5)
RBC # BLD: 2.72 M/UL — LOW (ref 4.2–5.4)
RBC # FLD: 15 % — HIGH (ref 11.5–14.5)
SODIUM SERPL-SCNC: 141 MMOL/L — SIGNIFICANT CHANGE UP (ref 135–146)
SPECIMEN SOURCE: SIGNIFICANT CHANGE UP
SPECIMEN SOURCE: SIGNIFICANT CHANGE UP
WBC # BLD: 6.69 K/UL — SIGNIFICANT CHANGE UP (ref 4.8–10.8)
WBC # FLD AUTO: 6.69 K/UL — SIGNIFICANT CHANGE UP (ref 4.8–10.8)

## 2024-06-02 PROCEDURE — 99239 HOSP IP/OBS DSCHRG MGMT >30: CPT

## 2024-06-02 RX ORDER — POLYETHYLENE GLYCOL 3350 17 G/17G
17 POWDER, FOR SOLUTION ORAL
Qty: 0 | Refills: 0 | DISCHARGE
Start: 2024-06-02

## 2024-06-02 RX ORDER — SENNA PLUS 8.6 MG/1
2 TABLET ORAL
Qty: 0 | Refills: 0 | DISCHARGE
Start: 2024-06-02

## 2024-06-02 RX ORDER — ASPIRIN/CALCIUM CARB/MAGNESIUM 324 MG
1 TABLET ORAL
Qty: 0 | Refills: 0 | DISCHARGE

## 2024-06-02 RX ORDER — HYDRALAZINE HCL 50 MG
1 TABLET ORAL
Refills: 0 | DISCHARGE

## 2024-06-02 RX ORDER — ENOXAPARIN SODIUM 100 MG/ML
80 INJECTION SUBCUTANEOUS
Refills: 0 | DISCHARGE

## 2024-06-02 RX ORDER — HYDROCHLOROTHIAZIDE 25 MG
1 TABLET ORAL
Refills: 0 | DISCHARGE

## 2024-06-02 RX ORDER — DOCUSATE SODIUM 100 MG
2 CAPSULE ORAL
Refills: 0 | DISCHARGE

## 2024-06-02 RX ADMIN — Medication 1000 MILLIGRAM(S): at 06:01

## 2024-06-02 RX ADMIN — ISOSORBIDE DINITRATE 10 MILLIGRAM(S): 5 TABLET ORAL at 11:20

## 2024-06-02 RX ADMIN — Medication 2000 UNIT(S): at 11:19

## 2024-06-02 RX ADMIN — ISOSORBIDE DINITRATE 10 MILLIGRAM(S): 5 TABLET ORAL at 05:17

## 2024-06-02 RX ADMIN — Medication 1000 MILLIGRAM(S): at 13:01

## 2024-06-02 RX ADMIN — PANTOPRAZOLE SODIUM 40 MILLIGRAM(S): 20 TABLET, DELAYED RELEASE ORAL at 05:16

## 2024-06-02 RX ADMIN — Medication 1 TABLET(S): at 11:19

## 2024-06-02 RX ADMIN — Medication 1000 MILLIGRAM(S): at 05:16

## 2024-06-02 RX ADMIN — Medication 500 MILLIGRAM(S): at 11:20

## 2024-06-02 NOTE — DISCHARGE NOTE PROVIDER - NSDCCPCAREPLAN_GEN_ALL_CORE_FT
PRINCIPAL DISCHARGE DIAGNOSIS  Diagnosis: Acute blood loss anemia  Assessment and Plan of Treatment: due to abdominal wall hematoma induce by lovenox  - hb stable. re-check in one week  - Stop lovenox. provoked dvt finished tt   - Hold aspirin until hb >8   - Iron sulfate daily to help get hb back to baseline   - IR consulted. no intervention needed it      SECONDARY DISCHARGE DIAGNOSES  Diagnosis: Deep hematoma  Assessment and Plan of Treatment:     Diagnosis: Normocytic anemia  Assessment and Plan of Treatment:     Diagnosis: History of DVT of lower extremity  Assessment and Plan of Treatment:     Diagnosis: Hyperkalemia  Assessment and Plan of Treatment:

## 2024-06-02 NOTE — DISCHARGE NOTE NURSING/CASE MANAGEMENT/SOCIAL WORK - PATIENT PORTAL LINK FT
You can access the FollowMyHealth Patient Portal offered by [facility] by registering at the following website: [email or web address]. By joining [facility]’s FollowMyHealth portal, you will also be able to view your health information using other applications (apps) compatible with our system.

## 2024-06-02 NOTE — DISCHARGE NOTE PROVIDER - ATTENDING DISCHARGE PHYSICAL EXAMINATION:
VITALS:   T(C): 36.9 (06-02-24 @ 04:30), Max: 37.3 (06-01-24 @ 20:03)  HR: 81 (06-02-24 @ 04:30) (81 - 96)  BP: 147/84 (06-02-24 @ 04:30) (108/69 - 147/84)  RR: 18 (06-02-24 @ 04:30) (18 - 18)  SpO2: 95% (06-02-24 @ 04:30) (95% - 95%)    GENERAL: NAD, lying in bed comfortably  HEART: Regular rate and rhythm  LUNGS: Unlabored respirations.  Clear to auscultation bilaterally  ABDOMEN: Soft, nontender, nondistended, +BS  EXTREMITIES: 2+ peripheral pulses bilateraly   NERVOUS SYSTEM:  A&Ox3,

## 2024-06-02 NOTE — DISCHARGE NOTE NURSING/CASE MANAGEMENT/SOCIAL WORK - NSFLUVACAGEDISCH_IMM_ALL_CORE
Outpatient Psychiatry Follow-Up Visit (MD/NP)    10/6/2020     Clinical Status of Patient:  Outpatient (Ambulatory)    Chief Complaint:  Zonia Skaggs is a 43 y.o. female who presents today for follow-up of mood disorder.  Met with patient.      Interval History and Content of Current Session:  Interim Events/Subjective Report/Content of Current Session: Patient Zonia Skaggs presents to clinic for follow up.  Doing well as compared to the past.  Sleeping late in the mornings but feels that she is less grouchy if she gets more sleep.  She is happy that she found out her  was not cheating on her and instead was hiding that he was smoking marijuana daily.  She confronted him about this but he continues to smoke.  She has started taking a lot of herbal to help with her health but has noted to gain a significant amount of weight since last visit.  She started to go to gym but stopped going.  She says that her mood is really good at this time and has no extremes.  She is able to walk away from arguments with her  and/or family.  No anxiety.  No psychosis.  Asking to continue with her current medications no side effects noted or endorsed other than weight gain.    Psychotherapy:  · Target symptoms: mood disorder  · Why chosen therapy is appropriate versus another modality: relevant to diagnosis  · Outcome monitoring methods: self-report, observation  · Therapeutic intervention type: supportive psychotherapy  · Topics discussed/themes: building skills sets for symptom management, symptom recognition  · The patient's response to the intervention is accepting. The patient's progress toward treatment goals is limited.   · Duration of intervention: 15 minutes.    Review of Systems   · PSYCHIATRIC: Pertinant items are noted in the narrative.  · CONSTITUTIONAL: Positive for weight gain.   · MUSCULOSKELETAL: No pain or stiffness of the joints.  · NEUROLOGIC: No weakness, sensory changes, seizures, confusion, memory  "loss, tremor or other abnormal movements.  · RESPIRATORY: No shortness of breath.  · CARDIOVASCULAR: No tachycardia or chest pain.  · GASTROINTESTINAL: No nausea, vomiting, pain, constipation or diarrhea.    Past Medical, Family and Social History: The patient's past medical, family and social history have been reviewed and updated as appropriate within the electronic medical record - see encounter notes.    Compliance:  Not fully compliant with medications because she does not want to take medications daily.    Side effects: None    Risk Parameters:  Patient reports no suicidal ideation  Patient reports no homicidal ideation  Patient reports no self-injurious behavior  Patient reports no violent behavior    Exam (detailed: at least 9 elements; comprehensive: all 15 elements)   Constitutional  Vitals:  Most recent vital signs, dated less than 90 days prior to this appointment, were reviewed.   Vitals:    10/06/20 1010   BP: 120/79   Pulse: 90   SpO2: 98%   Weight: 121.2 kg (267 lb 1.4 oz)   Height: 5' 6" (1.676 m)        General:  age appropriate, overweight     Musculoskeletal  Muscle Strength/Tone:  no tremor, no tic   Gait & Station:  non-ataxic     Psychiatric  Speech:  no latency; no press   Mood & Affect:  euthymic  congruent and appropriate   Thought Process:  normal and logical   Associations:  intact   Thought Content:  normal, no suicidality, no homicidality, delusions, or paranoia   Insight:  has awareness of illness   Judgement: behavior is adequate to circumstances   Orientation:  person, place, situation, time/date   Memory: intact for content of interview   Language: grossly intact   Attention Span & Concentration:  able to focus   Fund of Knowledge:  intact and appropriate to age and level of education     Assessment and Diagnosis   Status/Progress: Based on the examination today, the patient's problem(s) is/are inadequately controlled.  New problems have been presented today.   Co-morbidities are " complicating management of the primary condition.  There are no active rule-out diagnoses for this patient at this time.     General Impression: We will continue pharmacological intervention and adjunctive therapy.       ICD-10-CM ICD-9-CM   1. Bipolar I disorder, most recent episode mixed, in partial remission  F31.77 296.65   2. Adjustment disorder with mixed anxiety and depressed mood  F43.23 309.28       Intervention/Counseling/Treatment Plan   · Medication Management: Continue current medications. The risks and benefits of medication were discussed with the patient.  · Counseling provided with patient as follows: importance of compliance with chosen treatment options was emphasized, risks and benefits of treatment options, including medications, were discussed with the patient, risk factor reduction, prognosis, patient education, instructions for  management, treatment and follow-up were reviewed  1.  Continue topiramate 50mg BID (taking it every night and most days) targeting mood stabilization if she would like to start.  Warned of risk of word finding difficulties.  She does not have to take the dose at bedtime but yet take the 2nd dose in the middle of the day.  2.  Continue an urge compliance with daily use of olanzapine 5 mg p.o. nightly p.r.n. anxiety, elevated state, hypomania, sleep problems.  Warned of risk of TD, EPS, metabolic syndrome.  3.  Continue citalopram 20 mg p.o. daily targeting depression and anxiety.  Warned of risk of walt, suicidality, serotonin syndrome.  This may be contributing to her sleepiness so will change to another SSRI.  4.  Told patient to limit stressors in life and start some form of meditation to help with stress management.  Asked her to get established again with individual therapy.  5.  I fear that patient may have an exacerbation of illness if she does not maintain compliance with medications.  She has a history of becoming noncompliant.      Return to Clinic: 6  months, as needed     Adult

## 2024-06-02 NOTE — DISCHARGE NOTE PROVIDER - HOSPITAL COURSE
This is a 75 yo F from  w/PM of HTN, DM, CKD, schizophrenia, DVT on Lovenox, and surgical history of oophorectomy who was brought in by EMS for burning right lower abdominal pain that started the day before presentation with enlarging mass at the same site, now admitted to medicine for work up and management of abdominal wall hematoma.    1. Abdominal pain due to Abdominal wall mass due to hematoma, less likely infected complicated by acute blood loss anemia s/p 3 PRBC   - Abdominal binder          - UA negative  - Hold AC and aspirin (AC for DVT, aspirin for atherosclerotic heart disease per the chart)  Hold aspirin until seen by PMD   * [facility] records reviewed. pt had no DVT and was on heparin px due to limited mobility and covid   - Pain control with Tylenol and PRN oxycodone  - Keep using abdominal binder   - Hgb 8.6 (5/27) -> 6.7 (5/28) -> 5.1 (5/28), now s/p 2 u pRBC  - Receive 2 PRBC. Transfuse another 1 PRBC   - Was on Venofer 200mg IV daily. DC on iron sulfate    - BCx x 2 (5/27) NGTD  - BL LE venous duplex (5/28): No evidence of deep venous thrombosis in either lower extremity  - CT Abdomen and Pelvis w/ IV Cont IMPRESSION:   a) Patent anterior abdominal wall 16.3 x 9.0 x 9.0 cm collection with layering hyperdensities and surrounding soft tissue stranding. Findings may reflect hematoma in appropriate clinical setting. Additional anterior abdominal wall subcutaneous soft tissue nodular densities, possibly reflecting injection granulomas/hematomas. Left lower lobe 0.5 cm pulmonary nodule.  - Repeat CT abdomen pelvis:  a) No CTA evidence of active arterial hemorrhage.  b) Slight interval increase of right lower abdominal wall hematoma, now measuring 19.2 x 7.3 cm (previously 15.3 x 8.2 cm). Layering/higher density material within the dependent portions of the collection. This may reflect recent or prior venous hemorrhage.  c) Stool impaction of the rectum measuring up to 7.4 cm.  - Surgery on board, impression is hematoma:   a) no surgical intervention needed, HOLD AC, restart when hgb is stable  - IR consult and follow up on 03/31/2024:  a) Hematoma appears similar in size from prior study (different morphology due to abdominal binder).   b) No embolization at this time.   c) Cont. non-invasive measures: hold anticoagulation, cont. abdominal binder, trend Hgb and transfuse PRBC PRN.   d) Additionally, no DVT seen on LE venous duplex US and no IVC filter planned.      2. Acute kidney injury on top of ckd stage 3b resolving complicated by hyperkalemia  - S/P two doses of Lokelma   - s/p insulin/dextrose (5/28)  - Was on IVF     3. HTN / HLD  - Stop hydralazine it was ordered daily   - Stop HCTZ (bp seems overcontrolled)   - Cont  pravastatin,   - Cont isosorbide dinitrate     4. Constipation resolved   - KUB:constipation but had bm after  - Cont laxative . DC on laxative     5. No mention why pt should be on Isoniazid     6.hx of  Provoked DVT 01/2024   - Finished anticoag. no need for further anticoag. Stop lovenox This is a 75 yo F from  w/[facility] of HTN, DM, CKD, schizophrenia, DVT on Lovenox, and surgical history of oophorectomy who was brought in by EMS for burning right lower abdominal pain that started the day before presentation with enlarging mass at the same site, now admitted to medicine for work up and management of abdominal wall hematoma.    1. Abdominal pain due to Abdominal wall mass due to hematoma, less likely infected complicated by acute blood loss anemia s/p 3 PRBC   - Abdominal binder          - UA negative  - Hold AC and aspirin (AC for DVT, aspirin for atherosclerotic heart disease per the chart)  Hold aspirin until seen by PMD . Repeat CBC within 1 week   * [facility] records reviewed. pt had no DVT and was on heparin px due to limited mobility and covid   - Pain control with Tylenol and PRN oxycodone  - Keep using abdominal binder   - Hgb 8.6 (5/27) -> 6.7 (5/28) -> 5.1 (5/28), now s/p 2 u pRBC  - Receive 2 PRBC. Transfuse another 1 PRBC   - Was on Venofer 200mg IV daily. DC on iron sulfate    - BCx x 2 (5/27) NGTD  - BL LE venous duplex (5/28): No evidence of deep venous thrombosis in either lower extremity  - CT Abdomen and Pelvis w/ IV Cont IMPRESSION:   a) Patent anterior abdominal wall 16.3 x 9.0 x 9.0 cm collection with layering hyperdensities and surrounding soft tissue stranding. Findings may reflect hematoma in appropriate clinical setting. Additional anterior abdominal wall subcutaneous soft tissue nodular densities, possibly reflecting injection granulomas/hematomas. Left lower lobe 0.5 cm pulmonary nodule.  - Repeat CT abdomen pelvis:  a) No CTA evidence of active arterial hemorrhage.  b) Slight interval increase of right lower abdominal wall hematoma, now measuring 19.2 x 7.3 cm (previously 15.3 x 8.2 cm). Layering/higher density material within the dependent portions of the collection. This may reflect recent or prior venous hemorrhage.  c) Stool impaction of the rectum measuring up to 7.4 cm.  - Surgery on board, impression is hematoma:   a) no surgical intervention needed, HOLD AC, restart when hgb is stable  - IR consult and follow up on 03/31/2024:  a) Hematoma appears similar in size from prior study (different morphology due to abdominal binder).   b) No embolization at this time.   c) Cont. non-invasive measures: hold anticoagulation, cont. abdominal binder, trend Hgb and transfuse PRBC PRN.   d) Additionally, no DVT seen on LE venous duplex US and no IVC filter planned.      2. Acute kidney injury on top of ckd stage 3b resolving complicated by hyperkalemia  - S/P two doses of Lokelma   - s/p insulin/dextrose (5/28)  - Was on IVF     3. HTN / HLD  - Stop hydralazine it was ordered daily   - Stop HCTZ (bp seems overcontrolled)   - Cont  pravastatin,   - Cont isosorbide dinitrate     4. Constipation resolved   - KUB:constipation but had bm after  - Cont laxative . DC on laxative     5. No mention why pt should be on Isoniazid     6.hx of  Provoked DVT 01/2024   - Finished anticoag. no need for further anticoag. Stop lovenox

## 2024-06-02 NOTE — DISCHARGE NOTE PROVIDER - NSDCMRMEDTOKEN_GEN_ALL_CORE_FT
ascorbic acid 500 mg oral tablet: 1 tab(s) orally once a day  aspirin 81 mg oral capsule: 1 cap(s) orally once a day Hold until seen by PMD  cholecalciferol 50 mcg (2000 intl units) oral tablet: 1 tab(s) orally once a day  ferrous sulfate 325 mg (65 mg elemental iron) oral tablet: 1 tab(s) orally once a day  isoniazid 100 mg oral tablet: 0.5 tab(s) orally 2 times a day  isosorbide dinitrate 10 mg oral tablet: 1 tab(s) orally every 8 hours  mirtazapine 15 mg oral tablet: 1 tab(s) orally once a day (at bedtime)  Multiple Vitamins oral tablet: 1 tab(s) orally once a day  polyethylene glycol 3350 oral powder for reconstitution: 17 gram(s) orally 2 times a day  pravastatin 80 mg oral tablet: 1 tab(s) orally once a day (at bedtime)  senna leaf extract oral tablet: 2 tab(s) orally once a day (at bedtime)

## 2024-06-02 NOTE — DISCHARGE NOTE PROVIDER - CARE PROVIDER_API CALL
Maurice Castañeda  Geriatric Medicine  N  RAY HERNANDEZ, [place],    Phone: [phone]  Fax: [phone]  Established Patient  Follow Up Time: 1 week  
48